# Patient Record
Sex: FEMALE | Race: WHITE | Employment: OTHER | URBAN - METROPOLITAN AREA
[De-identification: names, ages, dates, MRNs, and addresses within clinical notes are randomized per-mention and may not be internally consistent; named-entity substitution may affect disease eponyms.]

---

## 2017-01-11 ENCOUNTER — OFFICE VISIT (OUTPATIENT)
Dept: PAIN MANAGEMENT | Age: 57
End: 2017-01-11

## 2017-01-11 VITALS
BODY MASS INDEX: 27.76 KG/M2 | SYSTOLIC BLOOD PRESSURE: 133 MMHG | WEIGHT: 172 LBS | HEART RATE: 82 BPM | DIASTOLIC BLOOD PRESSURE: 79 MMHG

## 2017-01-11 DIAGNOSIS — Z98.890 HISTORY OF LUMBOSACRAL SPINE SURGERY: ICD-10-CM

## 2017-01-11 DIAGNOSIS — G89.29 CHRONIC BILATERAL LOW BACK PAIN WITHOUT SCIATICA: Primary | ICD-10-CM

## 2017-01-11 DIAGNOSIS — M54.2 CHRONIC NECK PAIN: ICD-10-CM

## 2017-01-11 DIAGNOSIS — M96.1 POSTLAMINECTOMY SYNDROME, LUMBAR REGION: ICD-10-CM

## 2017-01-11 DIAGNOSIS — M54.50 CHRONIC BILATERAL LOW BACK PAIN WITHOUT SCIATICA: Primary | ICD-10-CM

## 2017-01-11 DIAGNOSIS — M47.892 OTHER OSTEOARTHRITIS OF SPINE, CERVICAL REGION: ICD-10-CM

## 2017-01-11 DIAGNOSIS — M46.1 BILATERAL SACROILIITIS (HCC): ICD-10-CM

## 2017-01-11 DIAGNOSIS — G89.29 CHRONIC NECK PAIN: ICD-10-CM

## 2017-01-11 DIAGNOSIS — M53.3 SACROILIAC JOINT PAIN: ICD-10-CM

## 2017-01-11 DIAGNOSIS — M25.552 HIP PAIN, BILATERAL: ICD-10-CM

## 2017-01-11 DIAGNOSIS — M51.37 DEGENERATION OF LUMBAR OR LUMBOSACRAL INTERVERTEBRAL DISC: ICD-10-CM

## 2017-01-11 DIAGNOSIS — M25.551 HIP PAIN, BILATERAL: ICD-10-CM

## 2017-01-11 DIAGNOSIS — M48.061 LUMBAR STENOSIS: ICD-10-CM

## 2017-01-11 DIAGNOSIS — Z87.448 HISTORY OF PYELONEPHRITIS: ICD-10-CM

## 2017-01-11 RX ORDER — HYDROCODONE BITARTRATE AND ACETAMINOPHEN 10; 325 MG/1; MG/1
1 TABLET ORAL
Qty: 120 TAB | Refills: 0 | Status: SHIPPED | OUTPATIENT
Start: 2017-01-17 | End: 2017-01-11 | Stop reason: SDUPTHER

## 2017-01-11 RX ORDER — MORPHINE SULFATE 30 MG/1
30 TABLET, FILM COATED, EXTENDED RELEASE ORAL EVERY 24 HOURS
Qty: 30 TAB | Refills: 0 | Status: SHIPPED | OUTPATIENT
Start: 2017-01-17 | End: 2017-03-08 | Stop reason: SDUPTHER

## 2017-01-11 RX ORDER — HYDROCODONE BITARTRATE AND ACETAMINOPHEN 10; 325 MG/1; MG/1
1 TABLET ORAL
Qty: 120 TAB | Refills: 0 | Status: SHIPPED | OUTPATIENT
Start: 2017-02-16 | End: 2017-03-08 | Stop reason: SDUPTHER

## 2017-01-11 RX ORDER — HYDROCODONE BITARTRATE AND ACETAMINOPHEN 10; 325 MG/1; MG/1
1 TABLET ORAL
Qty: 50 TAB | Refills: 0 | Status: SHIPPED | OUTPATIENT
Start: 2017-02-16 | End: 2017-01-11

## 2017-01-11 RX ORDER — MORPHINE SULFATE 30 MG/1
30 TABLET, FILM COATED, EXTENDED RELEASE ORAL EVERY 24 HOURS
Qty: 30 TAB | Refills: 0 | Status: SHIPPED | OUTPATIENT
Start: 2017-02-16 | End: 2017-03-08 | Stop reason: SDUPTHER

## 2017-01-11 NOTE — MR AVS SNAPSHOT
Visit Information Date & Time Provider Department Dept. Phone Encounter #  
 1/11/2017  9:20 AM Myke Daly, [address] [place] [address] for Pain Management [phone] [id number] Upcoming Health Maintenance Date Due Hepatitis C Screening 1960 Pneumococcal 19-64 Medium Risk (1 of 1 - PPSV23) 4/23/1979 DTaP/Tdap/Td series (1 - Tdap) 4/23/1981 PAP AKA CERVICAL CYTOLOGY 4/23/1981 BREAST CANCER SCRN MAMMOGRAM 4/23/2010 FOBT Q 1 YEAR AGE 50-75 4/23/2010 INFLUENZA AGE 9 TO ADULT 8/1/2016 Allergies as of 1/11/2017  Review Complete On: 1/11/2017 By: Josue Carter LPN Severity Noted Reaction Type Reactions Iodinated Contrast Media - Oral And Iv Dye High 12/19/2012   Side Effect Anaphylaxis Compazine [Prochlorperazine Edisylate]  12/19/2012   Side Effect Angioedema Dystonic tongue Levaquin [Levofloxacin]  12/19/2012    Hives Prednisone  12/19/2012    Anxiety Current Immunizations  Never Reviewed Name Date Influenza Vaccine 10/12/2014 Not reviewed this visit You Were Diagnosed With   
  
 Codes Comments Chronic bilateral low back pain without sciatica    -  Primary ICD-10-CM: M54.5, G89.29 ICD-9-CM: 724.2, 338.29 Bilateral sacroiliitis (HCC)     ICD-10-CM: M46.1 ICD-9-CM: 720.2 Chronic neck pain     ICD-10-CM: M54.2, G89.29 ICD-9-CM: 723.1, 338.29 Degeneration of lumbar or lumbosacral intervertebral disc     ICD-10-CM: M51.37 
ICD-9-CM: 722.52 History of lumbosacral spine surgery     ICD-10-CM: Z98.890 ICD-9-CM: V15.29 Lumbar stenosis     ICD-10-CM: M48.06 
ICD-9-CM: 724.02 History of pyelonephritis     ICD-10-CM: Z87.440 ICD-9-CM: V13.02 Sacroiliac joint pain     ICD-10-CM: M53.3 ICD-9-CM: 724.6 Postlaminectomy syndrome, lumbar region     ICD-10-CM: M96.1 ICD-9-CM: 722.83 Hip pain, bilateral     ICD-10-CM: M25.551, M25.552 ICD-9-CM: 719.45   
 Other osteoarthritis of spine, cervical region     ICD-10-CM: Q75.966 Vitals BP Pulse Weight(growth percentile) BMI OB Status Smoking Status 133/79 (BP 1 Location: Right arm, BP Patient Position: Sitting) 82 172 lb (78 kg) 27.76 kg/m2 Hysterectomy Current Every Day Smoker Vitals History BMI and BSA Data Body Mass Index Body Surface Area  
 27.76 kg/m 2 1.91 m 2 [facility] [address] [phone] Your Updated Medication List  
  
   
This list is accurate as of: 1/11/17 10:09 AM.  Always use your most recent med list.  
  
  
  
  
 AMBIEN 10 mg tablet Generic drug:  zolpidem Take  by mouth nightly as needed for Sleep.  
  
 gabapentin 300 mg capsule Commonly known as:  NEURONTIN Take 300 mg by mouth three (3) times daily. HYDROcodone-acetaminophen  mg tablet Commonly known as:  Wick Denny Take 1 Tab by mouth four (4) times daily as needed for Pain for up to 30 days. Max Daily Amount: 4 Tabs. Indications: PAIN Start taking on:  2/16/2017 * morphine CR 30 mg CR tablet Commonly known as:  MS CONTIN Take 1 Tab by mouth every twenty-four (24) hours for 30 days. Max Daily Amount: 30 mg. for chronic, severe, refractory pain  Indications: CHRONIC PAIN, NEUROPATHIC PAIN, SEVERE PAIN Start taking on:  1/17/2017 * morphine CR 30 mg CR tablet Commonly known as:  MS CONTIN Take 1 Tab by mouth every twenty-four (24) hours for 30 days. Max Daily Amount: 30 mg. for chronic, severe, refractory pain  Indications: CHRONIC PAIN, NEUROPATHIC PAIN, SEVERE PAIN Start taking on:  2/16/2017  
  
 traZODone 100 mg tablet Commonly known as:  Lolo Dessert Take 2 Tabs by mouth nightly. For chronic insomnia * Notice: This list has 2 medication(s) that are the same as other medications prescribed for you.  Read the directions carefully, and ask your doctor or other care provider to review them with you. Prescriptions Printed Refills  
 morphine CR (MS CONTIN) 30 mg CR tablet 0 Starting on: 1/17/2017 Sig: Take 1 Tab by mouth every twenty-four (24) hours for 30 days. Max Daily Amount: 30 mg. for chronic, severe, refractory pain  Indications: CHRONIC PAIN, NEUROPATHIC PAIN, SEVERE PAIN Class: Print Route: Oral  
 morphine CR (MS CONTIN) 30 mg CR tablet 0 Starting on: 2/16/2017 Sig: Take 1 Tab by mouth every twenty-four (24) hours for 30 days. Max Daily Amount: 30 mg. for chronic, severe, refractory pain  Indications: CHRONIC PAIN, NEUROPATHIC PAIN, SEVERE PAIN Class: Print Route: Oral  
 HYDROcodone-acetaminophen (NORCO)  mg tablet 0 Starting on: 2/16/2017 Sig: Take 1 Tab by mouth four (4) times daily as needed for Pain for up to 30 days. Max Daily Amount: 4 Tabs. Indications: PAIN Class: Print Route: Oral  
  
Introducing [facility]! Dear Telly Escalera: Thank you for requesting a Moveline account. Our records indicate that you already have an active Moveline account. You can access your account anytime at [email or web address] Did you know that you can access your hospital and ER discharge instructions at any time in Moveline? You can also review all of your test results from your hospital stay or ER visit. Additional Information If you have questions, please visit the Frequently Asked Questions section of the Moveline website at [email or web address] Remember, Moveline is NOT to be used for urgent needs. For medical emergencies, dial 911. Now available from your iPhone and Android! Please provide this summary of care documentation to your next provider. Your primary care clinician is listed as Brendan Lomas. If you have any questions after today's visit, please call [phone].

## 2017-01-11 NOTE — PROGRESS NOTES
HISTORY OF PRESENT ILLNESS  Esha Garcia is a 64 y.o. female    HPI: Ms. Dustin Victoria  returns today for f/u of chronic low back pain, Neck pain, and polyarticular pain. H/o 2 lumbar surgery and 1 cervical fusion. She reports good improvement from her surgeries. She completed PT with some benefit. She did try prior epidurals prior to her surgery with no improvement, none since. Recent surgery for lumbar hernia 3/3/16. She continues unchanged since last visit. She is doing well with her current treatment plan. We recently adjusted her morphine ER down to once daily and she has been doing well with this adjustment. She is continuing to look for vacation/group home home and is settled for [place]. She says she has plans to split the vacation home with another couple. She is otherwise doing well with her current treatment plan with no new complaints today. I will have her follow-up in 2 months. Medication management consists of Morphine ER 30 mg QD and hydrocodone 10/325 QID PRN. Valium by her PCP (temporaray)  Medications are helping with pain control and quality of life. Her pain is 3-4/10 with medication and 6-7/10 without. Pt describes pain as aching and burning. Aggravating factors include standing and  walking. Relieved with rest, medication, lying down, and avoiding painful activities. Current treatment is helping to improve general activity, mood, walking, sleep, enjoyment of life. Pt does report constipation but under better control with MiraLAX and conservative treatment. She  is otherwise doing well with no other complaints today. She denies any adverse events including nausea, vomiting, dizziness, constipation, hallucinations, or seizures. Medications were brought to visit today.  Pill counts were appropriate    Attended education class 4/5/2016       Allergies   Allergen Reactions    Iodinated Contrast Media - Oral And Iv Dye Anaphylaxis    Compazine [Prochlorperazine Edisylate] Angioedema     Dystonic tongue    Levaquin [Levofloxacin] Hives    Prednisone Anxiety       Past Surgical History   Procedure Laterality Date    Hx lumbar diskectomy      Hx cholecystectomy      Hx appendectomy      Hx lysis of adhesions      Hx cervical diskectomy  2010    Hx other surgical  2006     Bladder suspension-     Hx  section  1987, 93, 95    Hx hysterectomy  1998     total    Hx orthopaedic       back surgery with hardware         Review of Systems   Constitutional: Negative. Negative for chills and fever. HENT: Negative for congestion and sore throat. Eyes: Negative for blurred vision and double vision. Respiratory: Negative for cough, shortness of breath and wheezing. Cardiovascular: Negative for chest pain and palpitations. Gastrointestinal: Negative for constipation, heartburn, nausea and vomiting. Genitourinary: Negative. Musculoskeletal: Positive for back pain, joint pain, myalgias and neck pain. Skin: Negative for itching and rash. Neurological: Negative for dizziness, seizures, loss of consciousness and headaches. Psychiatric/Behavioral: Negative. Negative for depression, hallucinations and suicidal ideas. The patient is not nervous/anxious. Physical Exam   Constitutional: She is oriented to person, place, and time and well-developed, well-nourished, and in no distress. No distress. HENT:   Head: Normocephalic and atraumatic. Eyes: EOM are normal.   Neck: Normal range of motion. Pulmonary/Chest: Effort normal.   Musculoskeletal: Normal range of motion. Neurological: She is alert and oriented to person, place, and time. She has normal reflexes. Skin: Skin is warm and dry. No rash noted. She is not diaphoretic. No erythema. Psychiatric: Mood, memory, affect and judgment normal.   Nursing note and vitals reviewed. ASSESSMENT:    1. Chronic bilateral low back pain without sciatica    2.  Bilateral sacroiliitis ([facility] [address]    3. Chronic neck pain    4. Degeneration of lumbar or lumbosacral intervertebral disc    5. History of lumbosacral spine surgery    6. Lumbar stenosis    7. History of pyelonephritis    8. Sacroiliac joint pain    9. Postlaminectomy syndrome, lumbar region    10. Hip pain, bilateral    11. Other osteoarthritis of spine, cervical region         [facility] Program was reviewed which does not demonstrate aberrancies and/or inconsistencies with regard to the historical prescribing of controlled medications to this patient by other providers. NOTE: Disclosed prescriptions for postsurgical pain 3/3, 3/10, 3/18, and 4/2. Cough syrup with hydrocodone 6/14, and 6/23. She reports that she did receive temporary rx for Valium by her PCP filled 11/11. PLAN / Pt Instructions:  1. Continue current plan with no evidence of addiction or diversion. Stable on current medication without adverse events. 2. Refill hydrocodone 10/325 mg up to 4 times daily as needed for pain. 3. Refill  morphine ER 30 mg  once daily. 4. Discussed risks of addiction, dependency, and opioid induced hyperalgesia. 5. Return to clinic in 2 months    Medications Ordered Today   Medications    morphine CR (MS CONTIN) 30 mg CR tablet     Sig: Take 1 Tab by mouth every twenty-four (24) hours for 30 days. Max Daily Amount: 30 mg. for chronic, severe, refractory pain  Indications: CHRONIC PAIN, NEUROPATHIC PAIN, SEVERE PAIN     Dispense:  30 Tab     Refill:  0    morphine CR (MS CONTIN) 30 mg CR tablet     Sig: Take 1 Tab by mouth every twenty-four (24) hours for 30 days. Max Daily Amount: 30 mg. for chronic, severe, refractory pain  Indications: CHRONIC PAIN, NEUROPATHIC PAIN, SEVERE PAIN     Dispense:  30 Tab     Refill:  0    DISCONTD: HYDROcodone-acetaminophen (NORCO)  mg tablet     Sig: Take 1 Tab by mouth four (4) times daily as needed for Pain for up to 30 days. Max Daily Amount: 4 Tabs. Indications: PAIN     Dispense:  120 Tab     Refill:  0    DISCONTD: HYDROcodone-acetaminophen (NORCO)  mg tablet     Sig: Take 1 Tab by mouth four (4) times daily as needed for Pain. Max Daily Amount: 4 Tabs. Dispense:  50 Tab     Refill:  0    HYDROcodone-acetaminophen (NORCO)  mg tablet     Sig: Take 1 Tab by mouth four (4) times daily as needed for Pain for up to 30 days. Max Daily Amount: 4 Tabs. Indications: PAIN     Dispense:  120 Tab     Refill:  0       Pain medications prescribed with the objective of pain relief and improved physical and psychosocial function in this patient. Spent 25 minutes with patient today reviewing the treatment plan, goals of treatment plan, and limitations of the treatment plan, to include the potential for side effects from medications and procedures. Zack Cisnerosma 1/11/2017      Note: Please excuse any typographical errors. Voice recognition software was used for this note and may cause mistakes.

## 2017-01-11 NOTE — PROGRESS NOTES
Nursing Notes    Patient presents to the office today in follow-up. Patient rates her pain at 6/10 on the numerical pain scale. Reviewed medications with counts as follows:    Rx Date filled Qty Dispensed Pill Count Last Dose Short   norco 10/325mg  12/18/16 120 33 01/10/2017 No    Morphine sulfate 30mg ER 12/18/16 30 8 01/10/2017 No                                     Comments:     POC UDS was not performed in office today    Any new labs or imaging since last appointment? NO    Have you been to an emergency room (ER) or urgent care clinic since your last visit? NO            Have you been hospitalized since your last visit? NO     If yes, where, when, and reason for visit? Have you seen or consulted any other health care providers outside of the [address] [place] [address]  since your last visit? YES     If yes, where, when, and reason for visit? pcp     HM deferred to pcp.

## 2017-01-11 NOTE — PATIENT INSTRUCTIONS
1. Continue current plan with no evidence of addiction or diversion. Stable on current medication without adverse events. 2. Refill hydrocodone 10/325 mg up to 4 times daily as needed for pain. 3. Refill  morphine ER 30 mg  once daily. 4. Discussed risks of addiction, dependency, and opioid induced hyperalgesia.    5. Return to clinic in 2 months

## 2017-03-08 ENCOUNTER — OFFICE VISIT (OUTPATIENT)
Dept: PAIN MANAGEMENT | Age: 57
End: 2017-03-08

## 2017-03-08 VITALS
DIASTOLIC BLOOD PRESSURE: 72 MMHG | WEIGHT: 172 LBS | BODY MASS INDEX: 27.76 KG/M2 | HEART RATE: 88 BPM | SYSTOLIC BLOOD PRESSURE: 105 MMHG

## 2017-03-08 DIAGNOSIS — M54.50 CHRONIC BILATERAL LOW BACK PAIN WITHOUT SCIATICA: ICD-10-CM

## 2017-03-08 DIAGNOSIS — M96.1 POSTLAMINECTOMY SYNDROME, LUMBAR REGION: ICD-10-CM

## 2017-03-08 DIAGNOSIS — M47.892 OTHER OSTEOARTHRITIS OF SPINE, CERVICAL REGION: ICD-10-CM

## 2017-03-08 DIAGNOSIS — M32.9 SLE (SYSTEMIC LUPUS ERYTHEMATOSUS) (HCC): ICD-10-CM

## 2017-03-08 DIAGNOSIS — G89.29 CHRONIC NECK PAIN: ICD-10-CM

## 2017-03-08 DIAGNOSIS — M46.1 BILATERAL SACROILIITIS (HCC): ICD-10-CM

## 2017-03-08 DIAGNOSIS — G89.29 CHRONIC BILATERAL LOW BACK PAIN WITHOUT SCIATICA: ICD-10-CM

## 2017-03-08 DIAGNOSIS — Z79.899 ENCOUNTER FOR LONG-TERM (CURRENT) USE OF MEDICATIONS: Primary | ICD-10-CM

## 2017-03-08 DIAGNOSIS — M54.2 CHRONIC NECK PAIN: ICD-10-CM

## 2017-03-08 DIAGNOSIS — M79.18 PIRIFORMIS MUSCLE PAIN: ICD-10-CM

## 2017-03-08 DIAGNOSIS — M53.3 SACROILIAC JOINT PAIN: ICD-10-CM

## 2017-03-08 DIAGNOSIS — M48.061 LUMBAR STENOSIS: ICD-10-CM

## 2017-03-08 DIAGNOSIS — M51.37 DEGENERATION OF LUMBAR OR LUMBOSACRAL INTERVERTEBRAL DISC: ICD-10-CM

## 2017-03-08 LAB
ALCOHOL UR POC: NORMAL
AMPHETAMINES UR POC: NEGATIVE
BARBITURATES UR POC: NEGATIVE
BENZODIAZEPINES UR POC: NORMAL
BUPRENORPHINE UR POC: NORMAL
CANNABINOIDS UR POC: NEGATIVE
CARISOPRODOL UR POC: NORMAL
COCAINE UR POC: NEGATIVE
FENTANYL UR POC: NORMAL
MDMA/ECSTASY UR POC: NEGATIVE
METHADONE UR POC: NEGATIVE
METHAMPHETAMINE UR POC: NEGATIVE
METHYLPHENIDATE UR POC: NEGATIVE
OPIATES UR POC: NORMAL
OXYCODONE UR POC: NORMAL
PHENCYCLIDINE UR POC: NORMAL
PROPOXYPHENE UR POC: NORMAL
TRAMADOL UR POC: NORMAL
TRICYCLICS UR POC: NEGATIVE

## 2017-03-08 RX ORDER — MORPHINE SULFATE 30 MG/1
30 TABLET, FILM COATED, EXTENDED RELEASE ORAL EVERY 24 HOURS
Qty: 30 TAB | Refills: 0 | Status: SHIPPED | OUTPATIENT
Start: 2017-04-17 | End: 2017-05-03 | Stop reason: SDUPTHER

## 2017-03-08 RX ORDER — HYDROCODONE BITARTRATE AND ACETAMINOPHEN 10; 325 MG/1; MG/1
1 TABLET ORAL
Qty: 120 TAB | Refills: 0 | Status: SHIPPED | OUTPATIENT
Start: 2017-03-18 | End: 2017-05-03 | Stop reason: SDUPTHER

## 2017-03-08 RX ORDER — HYDROCODONE BITARTRATE AND ACETAMINOPHEN 10; 325 MG/1; MG/1
1 TABLET ORAL
Qty: 120 TAB | Refills: 0 | Status: SHIPPED | OUTPATIENT
Start: 2017-04-17 | End: 2017-05-03 | Stop reason: SDUPTHER

## 2017-03-08 RX ORDER — MORPHINE SULFATE 30 MG/1
30 TABLET, FILM COATED, EXTENDED RELEASE ORAL EVERY 24 HOURS
Qty: 30 TAB | Refills: 0 | Status: SHIPPED | OUTPATIENT
Start: 2017-03-18 | End: 2017-05-03 | Stop reason: SDUPTHER

## 2017-03-08 NOTE — MR AVS SNAPSHOT
Visit Information Date & Time Provider Department Dept. Phone Encounter #  
 3/8/2017  1:00 PM Lexii Tirado, [facility] for Pain Management [phone] Follow-up Instructions Return in about 2 months (around 5/8/2017). Upcoming Health Maintenance Date Due Hepatitis C Screening 1960 Pneumococcal 19-64 Medium Risk (1 of 1 - PPSV23) 4/23/1979 DTaP/Tdap/Td series (1 - Tdap) 4/23/1981 PAP AKA CERVICAL CYTOLOGY 4/23/1981 BREAST CANCER SCRN MAMMOGRAM 4/23/2010 FOBT Q 1 YEAR AGE 50-75 4/23/2010 INFLUENZA AGE 9 TO ADULT 8/1/2016 Allergies as of 3/8/2017  Review Complete On: 3/8/2017 By: PRIYANKA Petersen Severity Noted Reaction Type Reactions Iodinated Contrast Media - Oral And Iv Dye High 12/19/2012   Side Effect Anaphylaxis Compazine [Prochlorperazine Edisylate]  12/19/2012   Side Effect Angioedema Dystonic tongue Levaquin [Levofloxacin]  12/19/2012    Hives Prednisone  12/19/2012    Anxiety Current Immunizations  Never Reviewed Name Date Influenza Vaccine 10/12/2014 Not reviewed this visit You Were Diagnosed With   
  
 Codes Comments Encounter for long-term (current) use of medications    -  Primary ICD-10-CM: X74.457 ICD-9-CM: V58.69   
 SLE (systemic lupus erythematosus) (HCC)     ICD-10-CM: M32.9 ICD-9-CM: 710.0 Bilateral sacroiliitis (HCC)     ICD-10-CM: M46.1 ICD-9-CM: 720.2 Lumbar stenosis     ICD-10-CM: M48.06 
ICD-9-CM: 724.02 Chronic bilateral low back pain without sciatica     ICD-10-CM: M54.5, G89.29 ICD-9-CM: 724.2, 338.29 Degeneration of lumbar or lumbosacral intervertebral disc     ICD-10-CM: M51.37 
ICD-9-CM: 722.52 Chronic neck pain     ICD-10-CM: M54.2, G89.29 ICD-9-CM: 723.1, 338.29 Other osteoarthritis of spine, cervical region     ICD-10-CM: G63.206 Sacroiliac joint pain     ICD-10-CM: M53.3 ICD-9-CM: 724.6 Postlaminectomy syndrome, lumbar region     ICD-10-CM: M96.1 ICD-9-CM: 722.83 Piriformis muscle pain     ICD-10-CM: M79.1 ICD-9-CM: 729.1 Vitals BP Pulse Weight(growth percentile) BMI OB Status Smoking Status 105/72 (BP 1 Location: Left arm, BP Patient Position: Sitting) 88 172 lb (78 kg) 27.76 kg/m2 Hysterectomy Current Every Day Smoker BMI and BSA Data Body Mass Index Body Surface Area  
 27.76 kg/m 2 1.91 m 2 Preferred Pharmacy Pharmacy Name Phone [facility] [address] [phone] Your Updated Medication List  
  
   
This list is accurate as of: 3/8/17  2:01 PM.  Always use your most recent med list.  
  
  
  
  
 AMBIEN 10 mg tablet Generic drug:  zolpidem Take  by mouth nightly as needed for Sleep.  
  
 gabapentin 300 mg capsule Commonly known as:  NEURONTIN Take 300 mg by mouth three (3) times daily. * HYDROcodone-acetaminophen  mg tablet Commonly known as:  Nelma Ct Take 1 Tab by mouth four (4) times daily as needed for Pain for up to 30 days. Max Daily Amount: 4 Tabs. Indications: Pain Start taking on:  3/18/2017  
  
 * HYDROcodone-acetaminophen  mg tablet Commonly known as:  Nelma Ct Take 1 Tab by mouth four (4) times daily as needed for Pain for up to 30 days. Max Daily Amount: 4 Tabs. Indications: Pain Start taking on:  4/17/2017 * morphine CR 30 mg CR tablet Commonly known as:  MS CONTIN Take 1 Tab by mouth every twenty-four (24) hours for 30 days. Max Daily Amount: 30 mg. for chronic, severe, refractory pain  Indications: Chronic Pain, NEUROPATHIC PAIN, Severe Pain Start taking on:  3/18/2017 * morphine CR 30 mg CR tablet Commonly known as:  MS CONTIN Take 1 Tab by mouth every twenty-four (24) hours for 30 days. Max Daily Amount: 30 mg. for chronic, severe, refractory pain  Indications: Chronic Pain, NEUROPATHIC PAIN, Severe Pain Start taking on:  4/17/2017  
  
 traZODone 100 mg tablet Commonly known as:  Cornel Dinh Take 2 Tabs by mouth nightly. For chronic insomnia * Notice: This list has 4 medication(s) that are the same as other medications prescribed for you. Read the directions carefully, and ask your doctor or other care provider to review them with you. Prescriptions Printed Refills  
 morphine CR (MS CONTIN) 30 mg CR tablet 0 Starting on: 3/18/2017 Sig: Take 1 Tab by mouth every twenty-four (24) hours for 30 days. Max Daily Amount: 30 mg. for chronic, severe, refractory pain  Indications: Chronic Pain, NEUROPATHIC PAIN, Severe Pain Class: Print Route: Oral  
 morphine CR (MS CONTIN) 30 mg CR tablet 0 Starting on: 4/17/2017 Sig: Take 1 Tab by mouth every twenty-four (24) hours for 30 days. Max Daily Amount: 30 mg. for chronic, severe, refractory pain  Indications: Chronic Pain, NEUROPATHIC PAIN, Severe Pain Class: Print Route: Oral  
 HYDROcodone-acetaminophen (NORCO)  mg tablet 0 Starting on: 3/18/2017 Sig: Take 1 Tab by mouth four (4) times daily as needed for Pain for up to 30 days. Max Daily Amount: 4 Tabs. Indications: Pain Class: Print Route: Oral  
 HYDROcodone-acetaminophen (NORCO)  mg tablet 0 Starting on: 4/17/2017 Sig: Take 1 Tab by mouth four (4) times daily as needed for Pain for up to 30 days. Max Daily Amount: 4 Tabs. Indications: Pain Class: Print Route: Oral  
  
We Performed the Following AMB POC DRUG SCREEN () [ [facility]] DRUG SCREEN [[id number] Custom] Follow-up Instructions Return in about 2 months (around 5/8/2017). Patient Instructions 1. Continue current plan with no evidence of addiction or diversion. Stable on current medication without adverse events. 2. Refill hydrocodone 10/325 mg up to 4 times daily as needed for pain. 3. Refill  morphine ER 30 mg  once daily. 4. Add Home exercise program. Exercise/stretching were demonstrated in office today. 5. Discussed risks of addiction, dependency, and opioid induced hyperalgesia. 6. Return to clinic in 2 months Introducing [facility] & HEALTH SERVICES! Dear Bonilla Anderson: Thank you for requesting a Smart Balloon account. Our records indicate that you already have an active Smart Balloon account. You can access your account anytime at [email or web address] Did you know that you can access your hospital and ER discharge instructions at any time in Smart Balloon? You can also review all of your test results from your hospital stay or ER visit. Additional Information If you have questions, please visit the Frequently Asked Questions section of the Smart Balloon website at [email or web address] Remember, Smart Balloon is NOT to be used for urgent needs. For medical emergencies, dial 911. Now available from your iPhone and Android! Please provide this summary of care documentation to your next provider. Your primary care clinician is listed as Brendan Lomas. If you have any questions after today's visit, please call [phone].

## 2017-03-08 NOTE — PROGRESS NOTES
HISTORY OF PRESENT ILLNESS  Xochilt Parson is a 64 y.o. female    HPI: Ms. Tianna Stark  returns today for f/u of chronic low back pain, Neck pain, and polyarticular pain. H/o 2 lumbar surgery and 1 cervical fusion. She reports good improvement from her surgeries. She completed PT with some benefit. She did try prior epidurals prior to her surgery with no improvement, none since. Recent surgery for lumbar hernia 3/3/16. She reports some mild worsening low back pain since her last visit. She says this has been \"the worst 2 months since before her surgery. \"  She reports no acute injury or fall. She does think her worsening pain could be due to the weather. She reports her pain begins in her left buttock area just below her PSIS and radiates down her buttock aspect. She has been using heat. Her last CT scan was reviewed and discussed in office today which showed some inflammation of her piriformis muscle. During the exam she points to her piriformis area as the culprit of her pain. We discussed adding home exercise program.  Exercise/stretching were demonstrated in office today. I have advised her to continue with warm moist heat as well. I will have her follow-up in 2 months for reassessment. Medication management consists of Morphine ER 30 mg QD and hydrocodone 10/325 QID PRN. Valium, zolpidem, and Trazadon by her PCP. Medications are helping with pain control and quality of life. Her pain is 3-4/10 with medication and 6-7/10 without. Pt describes pain as aching and burning. Aggravating factors include standing and  walking. Relieved with rest, medication, lying down, and avoiding painful activities. Current treatment is helping to improve general activity, mood, walking, sleep, enjoyment of life. Pt does report constipation but under better control with MiraLAX and conservative treatment. She  is otherwise doing well with no other complaints today.  She denies any adverse events including nausea, vomiting, dizziness, constipation, hallucinations, or seizures. Attended education class 2016       Allergies   Allergen Reactions    Iodinated Contrast Media - Oral And Iv Dye Anaphylaxis    Compazine [Prochlorperazine Edisylate] Angioedema     Dystonic tongue    Levaquin [Levofloxacin] Hives    Prednisone Anxiety       Past Surgical History:   Procedure Laterality Date    HX APPENDECTOMY  2009    HX CERVICAL DISKECTOMY  2010    HX  SECTION  1987, 93, 95    HX CHOLECYSTECTOMY  2010    HX HYSTERECTOMY  1998    total    HX LUMBAR DISKECTOMY      HX LYSIS OF ADHESIONS      HX ORTHOPAEDIC      back surgery with hardware    HX OTHER SURGICAL  2006    Bladder suspension-          Review of Systems   Constitutional: Negative. Negative for chills and fever. HENT: Negative for congestion and sore throat. Eyes: Negative for blurred vision and double vision. Respiratory: Negative for cough, shortness of breath and wheezing. Cardiovascular: Negative for chest pain and palpitations. Gastrointestinal: Negative for constipation, heartburn, nausea and vomiting. Genitourinary: Negative. Musculoskeletal: Positive for back pain, joint pain, myalgias and neck pain. Skin: Negative for itching and rash. Neurological: Negative for dizziness, seizures, loss of consciousness and headaches. Psychiatric/Behavioral: Negative. Negative for depression, hallucinations and suicidal ideas. The patient is not nervous/anxious. Physical Exam   Constitutional: She is oriented to person, place, and time and well-developed, well-nourished, and in no distress. No distress. HENT:   Head: Normocephalic and atraumatic. Eyes: EOM are normal.   Neck: Normal range of motion. Pulmonary/Chest: Effort normal.   Musculoskeletal: Normal range of motion. Tender palpation in left piriformis aspect with mild spasm noted.    Neurological: She is alert and oriented to person, place, and time. She has normal reflexes. Skin: Skin is warm and dry. No rash noted. She is not diaphoretic. No erythema. Psychiatric: Mood, memory, affect and judgment normal.   Nursing note and vitals reviewed. ASSESSMENT:    1. Encounter for long-term (current) use of medications    2. SLE (systemic lupus erythematosus) ([facility] [address]    3. Bilateral sacroiliitis ([facility] [address]    4. Lumbar stenosis    5. Chronic bilateral low back pain without sciatica    6. Degeneration of lumbar or lumbosacral intervertebral disc    7. Chronic neck pain    8. Other osteoarthritis of spine, cervical region    9. Sacroiliac joint pain    10. Postlaminectomy syndrome, lumbar region    11. Piriformis muscle pain         [place] Prescription Monitoring Program was reviewed which does not demonstrate aberrancies and/or inconsistencies with regard to the historical prescribing of controlled medications to this patient by other providers. NOTE: Disclosed prescriptions for postsurgical pain 3/3, 3/10, 3/18, and 4/2. Cough syrup with hydrocodone 6/14, and 6/23. She reports that she did receive temporary rx for Valium by her PCP filled 11/11. PLAN / Pt Instructions:  1. Continue current plan with no evidence of addiction or diversion. Stable on current medication without adverse events. 2. Refill hydrocodone 10/325 mg up to 4 times daily as needed for pain. 3. Refill  morphine ER 30 mg  once daily. 4. Add Home exercise program. Exercise/stretching were demonstrated in office today. 5. Discussed risks of addiction, dependency, and opioid induced hyperalgesia. 6. Return to clinic in 2 months    Medications Ordered Today   Medications    morphine CR (MS CONTIN) 30 mg CR tablet     Sig: Take 1 Tab by mouth every twenty-four (24) hours for 30 days.  Max Daily Amount: 30 mg. for chronic, severe, refractory pain  Indications: Chronic Pain, NEUROPATHIC PAIN, Severe Pain     Dispense:  30 Tab     Refill:  0    morphine CR (MS CONTIN) 30 mg CR tablet     Sig: Take 1 Tab by mouth every twenty-four (24) hours for 30 days. Max Daily Amount: 30 mg. for chronic, severe, refractory pain  Indications: Chronic Pain, NEUROPATHIC PAIN, Severe Pain     Dispense:  30 Tab     Refill:  0    HYDROcodone-acetaminophen (NORCO)  mg tablet     Sig: Take 1 Tab by mouth four (4) times daily as needed for Pain for up to 30 days. Max Daily Amount: 4 Tabs. Indications: Pain     Dispense:  120 Tab     Refill:  0    HYDROcodone-acetaminophen (NORCO)  mg tablet     Sig: Take 1 Tab by mouth four (4) times daily as needed for Pain for up to 30 days. Max Daily Amount: 4 Tabs. Indications: Pain     Dispense:  120 Tab     Refill:  0       Pain medications prescribed with the objective of pain relief and improved physical and psychosocial function in this patient. Spent 25 minutes with patient today reviewing the treatment plan, goals of treatment plan, and limitations of the treatment plan, to include the potential for side effects from medications and procedures. Lily Beltran 3/8/2017      Note: Please excuse any typographical errors. Voice recognition software was used for this note and may cause mistakes.

## 2017-03-08 NOTE — PROGRESS NOTES
Nursing Notes    Patient presents to the office today in follow-up. Patient rates her pain at 7/10 on the numerical pain scale. Reviewed medications with counts as follows:    Rx Date filled Qty Dispensed Pill Count Last Dose Short   norco 10/325mg  02/16/17 120 40 This am  No    Morphine sulfate 30mg ER 02/16/17 30 9 This am  No                                     Comments:     POC UDS was performed in office today    Any new labs or imaging since last appointment? NO    Have you been to an emergency room (ER) or urgent care clinic since your last visit? NO            Have you been hospitalized since your last visit? NO     If yes, where, when, and reason for visit? Have you seen or consulted any other health care providers outside of the [address] [place] [address]  since your last visit?   YES     If yes, where, when, and reason for visit? pcp

## 2017-03-08 NOTE — PATIENT INSTRUCTIONS
1. Continue current plan with no evidence of addiction or diversion. Stable on current medication without adverse events. 2. Refill hydrocodone 10/325 mg up to 4 times daily as needed for pain. 3. Refill  morphine ER 30 mg  once daily. 4. Add Home exercise program. Exercise/stretching were demonstrated in office today. 5. Discussed risks of addiction, dependency, and opioid induced hyperalgesia.    6. Return to clinic in 2 months

## 2017-05-03 ENCOUNTER — OFFICE VISIT (OUTPATIENT)
Dept: PAIN MANAGEMENT | Age: 57
End: 2017-05-03

## 2017-05-03 VITALS — DIASTOLIC BLOOD PRESSURE: 70 MMHG | SYSTOLIC BLOOD PRESSURE: 107 MMHG | HEART RATE: 87 BPM | RESPIRATION RATE: 17 BRPM

## 2017-05-03 DIAGNOSIS — G89.29 CHRONIC BILATERAL LOW BACK PAIN WITHOUT SCIATICA: ICD-10-CM

## 2017-05-03 DIAGNOSIS — G57.02 PIRIFORMIS SYNDROME OF LEFT SIDE: Primary | ICD-10-CM

## 2017-05-03 DIAGNOSIS — M76.899 ENTHESOPATHY OF HIP REGION, UNSPECIFIED LATERALITY: ICD-10-CM

## 2017-05-03 DIAGNOSIS — M54.2 CHRONIC NECK PAIN: ICD-10-CM

## 2017-05-03 DIAGNOSIS — M48.061 LUMBAR STENOSIS: ICD-10-CM

## 2017-05-03 DIAGNOSIS — M54.50 CHRONIC BILATERAL LOW BACK PAIN WITHOUT SCIATICA: ICD-10-CM

## 2017-05-03 DIAGNOSIS — M96.1 POSTLAMINECTOMY SYNDROME, LUMBAR REGION: ICD-10-CM

## 2017-05-03 DIAGNOSIS — M46.1 BILATERAL SACROILIITIS (HCC): ICD-10-CM

## 2017-05-03 DIAGNOSIS — G89.29 CHRONIC NECK PAIN: ICD-10-CM

## 2017-05-03 DIAGNOSIS — M51.37 DEGENERATION OF LUMBAR OR LUMBOSACRAL INTERVERTEBRAL DISC: ICD-10-CM

## 2017-05-03 DIAGNOSIS — M53.3 SACROILIAC JOINT PAIN: ICD-10-CM

## 2017-05-03 RX ORDER — NALOXONE HYDROCHLORIDE 4 MG/.1ML
4 SPRAY NASAL AS NEEDED
Qty: 1 PACKAGE | Refills: 0 | Status: SHIPPED | OUTPATIENT
Start: 2017-05-03 | End: 2017-05-03 | Stop reason: CLARIF

## 2017-05-03 RX ORDER — MORPHINE SULFATE 30 MG/1
30 TABLET, FILM COATED, EXTENDED RELEASE ORAL EVERY 24 HOURS
Qty: 30 TAB | Refills: 0 | Status: SHIPPED | OUTPATIENT
Start: 2017-05-17 | End: 2017-06-28 | Stop reason: SDUPTHER

## 2017-05-03 RX ORDER — HYDROCODONE BITARTRATE AND ACETAMINOPHEN 10; 325 MG/1; MG/1
1 TABLET ORAL
Qty: 120 TAB | Refills: 0 | Status: SHIPPED | OUTPATIENT
Start: 2017-06-16 | End: 2017-06-28 | Stop reason: SDUPTHER

## 2017-05-03 RX ORDER — MORPHINE SULFATE 30 MG/1
30 TABLET, FILM COATED, EXTENDED RELEASE ORAL EVERY 24 HOURS
Qty: 30 TAB | Refills: 0 | Status: SHIPPED | OUTPATIENT
Start: 2017-06-16 | End: 2017-06-28 | Stop reason: SDUPTHER

## 2017-05-03 RX ORDER — HYDROCODONE BITARTRATE AND ACETAMINOPHEN 10; 325 MG/1; MG/1
1 TABLET ORAL
Qty: 120 TAB | Refills: 0 | Status: SHIPPED | OUTPATIENT
Start: 2017-05-17 | End: 2017-06-28 | Stop reason: SDUPTHER

## 2017-05-03 RX ORDER — NALOXONE HYDROCHLORIDE 4 MG/.1ML
4 SPRAY NASAL AS NEEDED
Qty: 1 PACKAGE | Refills: 0 | Status: SHIPPED | OUTPATIENT
Start: 2017-05-03 | End: 2020-09-04

## 2017-05-03 NOTE — MR AVS SNAPSHOT
Visit Information Date & Time Provider Department Dept. Phone Encounter #  
 5/3/2017  9:00 AM Lynsey Russ [facility] [phone] [id number] Follow-up Instructions Return in about 2 months (around 7/3/2017). Upcoming Health Maintenance Date Due Hepatitis C Screening 1960 Pneumococcal 19-64 Medium Risk (1 of 1 - PPSV23) 4/23/1979 DTaP/Tdap/Td series (1 - Tdap) 4/23/1981 PAP AKA CERVICAL CYTOLOGY 4/23/1981 BREAST CANCER SCRN MAMMOGRAM 4/23/2010 FOBT Q 1 YEAR AGE 50-75 4/23/2010 INFLUENZA AGE 9 TO ADULT 8/1/2017 Allergies as of 5/3/2017  Review Complete On: 5/3/2017 By: PRIYANKA Londono Severity Noted Reaction Type Reactions Iodinated Contrast Media - Oral And Iv Dye High 12/19/2012   Side Effect Anaphylaxis Compazine [Prochlorperazine Edisylate]  12/19/2012   Side Effect Angioedema Dystonic tongue Levaquin [Levofloxacin]  12/19/2012    Hives Prednisone  12/19/2012    Anxiety Current Immunizations  Never Reviewed Name Date Influenza Vaccine 10/12/2014 Not reviewed this visit You Were Diagnosed With   
  
 Codes Comments Postlaminectomy syndrome, lumbar region     ICD-10-CM: M96.1 ICD-9-CM: 722.83 Chronic bilateral low back pain without sciatica     ICD-10-CM: M54.5, G89.29 ICD-9-CM: 724.2, 338.29 Degeneration of lumbar or lumbosacral intervertebral disc     ICD-10-CM: M51.37 
ICD-9-CM: 722.52 Chronic neck pain     ICD-10-CM: M54.2, G89.29 ICD-9-CM: 723.1, 338.29 Sacroiliac joint pain     ICD-10-CM: M53.3 ICD-9-CM: 724.6 Enthesopathy of hip region, unspecified laterality     ICD-10-CM: M76.899 ICD-9-CM: 726.5 Lumbar stenosis     ICD-10-CM: M48.06 
ICD-9-CM: 724.02 Bilateral sacroiliitis (HCC)     ICD-10-CM: M46.1 ICD-9-CM: 720.2 Vitals BP Pulse Resp OB Status Smoking Status 107/70 87 17 Hysterectomy Current Every Day Smoker Vitals History Preferred Pharmacy Pharmacy Name Phone [facility] [id number] [phone] Your Updated Medication List  
  
   
This list is accurate as of: 5/3/17  9:56 AM.  Always use your most recent med list.  
  
  
  
  
 AMBIEN 10 mg tablet Generic drug:  zolpidem Take  by mouth nightly as needed for Sleep.  
  
 gabapentin 300 mg capsule Commonly known as:  NEURONTIN Take 300 mg by mouth three (3) times daily. * HYDROcodone-acetaminophen  mg tablet Commonly known as:  Labelle Martin Take 1 Tab by mouth four (4) times daily as needed for Pain for up to 30 days. Max Daily Amount: 4 Tabs. Indications: Pain Start taking on:  5/17/2017  
  
 * HYDROcodone-acetaminophen  mg tablet Commonly known as:  Roman Martin Take 1 Tab by mouth four (4) times daily as needed for Pain for up to 30 days. Max Daily Amount: 4 Tabs. Indications: Pain Start taking on:  6/16/2017 * morphine CR 30 mg CR tablet Commonly known as:  MS CONTIN Take 1 Tab by mouth every twenty-four (24) hours for 30 days. Max Daily Amount: 30 mg. for chronic, severe, refractory pain  Indications: Chronic Pain, NEUROPATHIC PAIN, Severe Pain Start taking on:  5/17/2017 * morphine CR 30 mg CR tablet Commonly known as:  MS CONTIN Take 1 Tab by mouth every twenty-four (24) hours for 30 days. Max Daily Amount: 30 mg. for chronic, severe, refractory pain  Indications: Chronic Pain, NEUROPATHIC PAIN, Severe Pain Start taking on:  6/16/2017  
  
 naloxone 4 mg/actuation Spry 4 mg by Nasal route as needed. For emergency use only  Indications: OPIATE-INDUCED RESPIRATORY DEPRESSION  
  
 traZODone 100 mg tablet Commonly known as:  Arneta Messenger Take 2 Tabs by mouth nightly. For chronic insomnia * Notice:   This list has 4 medication(s) that are the same as other medications prescribed for you. Read the directions carefully, and ask your doctor or other care provider to review them with you. Prescriptions Printed Refills  
 morphine CR (MS CONTIN) 30 mg CR tablet 0 Starting on: 2017 Sig: Take 1 Tab by mouth every twenty-four (24) hours for 30 days. Max Daily Amount: 30 mg. for chronic, severe, refractory pain  Indications: Chronic Pain, NEUROPATHIC PAIN, Severe Pain Class: Print Route: Oral  
 morphine CR (MS CONTIN) 30 mg CR tablet 0 Starting on: 2017 Sig: Take 1 Tab by mouth every twenty-four (24) hours for 30 days. Max Daily Amount: 30 mg. for chronic, severe, refractory pain  Indications: Chronic Pain, NEUROPATHIC PAIN, Severe Pain Class: Print Route: Oral  
 HYDROcodone-acetaminophen (NORCO)  mg tablet 0 Starting on: 2017 Sig: Take 1 Tab by mouth four (4) times daily as needed for Pain for up to 30 days. Max Daily Amount: 4 Tabs. Indications: Pain Class: Print Route: Oral  
 HYDROcodone-acetaminophen (NORCO)  mg tablet 0 Starting on: 2017 Sig: Take 1 Tab by mouth four (4) times daily as needed for Pain for up to 30 days. Max Daily Amount: 4 Tabs. Indications: Pain Class: Print Route: Oral  
  
Prescriptions Sent to Pharmacy Refills  
 naloxone 4 mg/actuation spry 0 Si mg by Nasal route as needed. For emergency use only  Indications: OPIATE-INDUCED RESPIRATORY DEPRESSION Class: Normal  
 Pharmacy: [facility]/pharmacy #[id number]- [address]  #: [phone] Route: Nasal  
  
Follow-up Instructions Return in about 2 months (around 7/3/2017). Patient Instructions 1. Continue current plan with no evidence of addiction or diversion. Stable on current medication without adverse events. 2. Refill hydrocodone 10/325 mg up to 4 times daily as needed for pain. 3. Refill  morphine ER 30 mg  once daily. 4. Add naloxone 0.4mg/0.4 mL auto injector for opioid induced respiratory depression emergency only. Extensive counseling was provided regarding this medication. Instructions were given to take home. 5. Schedule left piriformis Injection with Dr. Megan Sibley. 6. Add Home exercise program. Exercise/stretching were demonstrated in office today. 7. Discussed risks of addiction, dependency, and opioid induced hyperalgesia. 8. Return to clinic in 2 months Introducing [facility]! Dear Unknown Skylar: Thank you for requesting a [employer] account. Our records indicate that you already have an active [employer] account. You can access your account anytime at [email or web address] Gift It Did you know that you can access your hospital and ER discharge instructions at any time in [employer]? You can also review all of your test results from your hospital stay or ER visit. Additional Information If you have questions, please visit the Frequently Asked Questions section of the [employer] website at [email or web address] Remember, [employer] is NOT to be used for urgent needs. For medical emergencies, dial 911. Now available from your iPhone and Android! Please provide this summary of care documentation to your next provider. Your primary care clinician is listed as Brendan Lomas. If you have any questions after today's visit, please call [phone].

## 2017-05-03 NOTE — PATIENT INSTRUCTIONS
1. Continue current plan with no evidence of addiction or diversion. Stable on current medication without adverse events. 2. Refill hydrocodone 10/325 mg up to 4 times daily as needed for pain. 3. Refill  morphine ER 30 mg  once daily. 4. Add naloxone 0.4mg/0.4 mL auto injector for opioid induced respiratory depression emergency only. Extensive counseling was provided regarding this medication. Instructions were given to take home. 5. Schedule left piriformis Injection with Dr. Suyapa Woods. 6. Add Home exercise program. Exercise/stretching were demonstrated in office today. 7. Discussed risks of addiction, dependency, and opioid induced hyperalgesia.    8. Return to clinic in 2 months

## 2017-05-03 NOTE — PROGRESS NOTES
HISTORY OF PRESENT ILLNESS  Jocelyn Jimenez is a 62 y.o. female    HPI: Ms. Josué Henning  returns today for f/u of chronic low back pain, Neck pain, and polyarticular pain. H/o 2 lumbar surgery and 1 cervical fusion. She reports good improvement from her surgeries. She completed PT with some benefit. She did try prior epidurals prior to her surgery with no improvement, none since. Recent surgery for lumbar hernia 3/3/16. She continues with worsening low back pain. Her symptoms are consistent with piriformis syndrome. Her most recent lumbar CT scan was reviewed and discussed in office today which show some thickening of the left piriformis. I have recommended that we schedule a piriformis injection with Dr. Hope Enrique as soon as possible. We will continue with home exercise/stretching as well. I will have her follow-up in 2 months for reassessment. Because the patient's current regimen places him/her at increased risk for possible overdose, a prescription for naloxone nasal spray is being provided. The patient understands that this medication is only to be used in the setting of a possible overdose and that inadvertent use of this medication could precipitate overt withdrawal.    Medication management consists of Morphine ER 30 mg QD and hydrocodone 10/325 QID PRN. Valium, zolpidem, and Trazadon by her PCP. Medications are helping with pain control and quality of life. Her pain is 3-4/10 with medication and 6-7/10 without. Pt describes pain as aching and burning. Aggravating factors include standing and  walking. Relieved with rest, medication, lying down, and avoiding painful activities. Current treatment is helping to improve general activity, mood, walking, sleep, enjoyment of life. Pt does report constipation but under better control with MiraLAX and conservative treatment. She  is otherwise doing well with no other complaints today.  She denies any adverse events including nausea, vomiting, dizziness, constipation, hallucinations, or seizures. Attended education class 2016    PRIOR IMAGIN. Lumbar CT 16: 1. L3-L4 intervertebral and posterior spinous process fusion. No fracture lumbar spine. Asymmetric thickening of the left piriformis muscle, may be post op or trauma. Allergies   Allergen Reactions    Iodinated Contrast Media - Oral And Iv Dye Anaphylaxis    Compazine [Prochlorperazine Edisylate] Angioedema     Dystonic tongue    Levaquin [Levofloxacin] Hives    Prednisone Anxiety       Past Surgical History:   Procedure Laterality Date    HX APPENDECTOMY      HX CERVICAL DISKECTOMY      HX  SECTION  1987, 93, 95    HX CHOLECYSTECTOMY      HX HYSTERECTOMY      total    HX LUMBAR DISKECTOMY      HX LYSIS OF ADHESIONS      HX ORTHOPAEDIC      back surgery with hardware    HX OTHER SURGICAL  2006    Bladder suspension-          Review of Systems   Constitutional: Negative. Negative for chills and fever. HENT: Negative for congestion and sore throat. Eyes: Negative for blurred vision and double vision. Respiratory: Negative for cough, shortness of breath and wheezing. Cardiovascular: Negative for chest pain and palpitations. Gastrointestinal: Negative for constipation, heartburn, nausea and vomiting. Genitourinary: Negative. Musculoskeletal: Positive for back pain, joint pain, myalgias and neck pain. Skin: Negative for itching and rash. Neurological: Negative for dizziness, seizures, loss of consciousness and headaches. Psychiatric/Behavioral: Negative. Negative for depression, hallucinations and suicidal ideas. The patient is not nervous/anxious. Physical Exam   Constitutional: She is oriented to person, place, and time and well-developed, well-nourished, and in no distress. No distress. HENT:   Head: Normocephalic and atraumatic. Eyes: EOM are normal.   Neck: Normal range of motion. Pulmonary/Chest: Effort normal.   Musculoskeletal: Normal range of motion. Tender palpation in left piriformis aspect with mild spasm noted. Neurological: She is alert and oriented to person, place, and time. She has normal reflexes. Skin: Skin is warm and dry. No rash noted. She is not diaphoretic. No erythema. Psychiatric: Mood, memory, affect and judgment normal.   Nursing note and vitals reviewed. ASSESSMENT:    1. Piriformis syndrome of left side    2. Postlaminectomy syndrome, lumbar region    3. Chronic bilateral low back pain without sciatica    4. Degeneration of lumbar or lumbosacral intervertebral disc    5. Chronic neck pain    6. Sacroiliac joint pain    7. Enthesopathy of hip region, unspecified laterality    8. Lumbar stenosis    9. Bilateral sacroiliitis [address] Prescription Monitoring Program was reviewed which does not demonstrate aberrancies and/or inconsistencies with regard to the historical prescribing of controlled medications to this patient by other providers. NOTE: Disclosed prescriptions for postsurgical pain 3/3, 3/10, 3/18, and 4/2. Cough syrup with hydrocodone 6/14, and 6/23. She reports that she did receive temporary rx for Valium by her PCP filled 11/11. PLAN / Pt Instructions:  1. Continue current plan with no evidence of addiction or diversion. Stable on current medication without adverse events. 2. Refill hydrocodone 10/325 mg up to 4 times daily as needed for pain. 3. Refill  morphine ER 30 mg  once daily. 4. Add naloxone 0.4mg/0.4 mL auto injector for opioid induced respiratory depression emergency only. Extensive counseling was provided regarding this medication. Instructions were given to take home. 5. Schedule left piriformis Injection with Dr. Diana Talley. 6. Add Home exercise program. Exercise/stretching were demonstrated in office today. 7. Discussed risks of addiction, dependency, and opioid induced hyperalgesia.    8. Return to clinic in 2 months    Medications Ordered Today   Medications    morphine CR (MS CONTIN) 30 mg CR tablet     Sig: Take 1 Tab by mouth every twenty-four (24) hours for 30 days. Max Daily Amount: 30 mg. for chronic, severe, refractory pain  Indications: Chronic Pain, NEUROPATHIC PAIN, Severe Pain     Dispense:  30 Tab     Refill:  0    morphine CR (MS CONTIN) 30 mg CR tablet     Sig: Take 1 Tab by mouth every twenty-four (24) hours for 30 days. Max Daily Amount: 30 mg. for chronic, severe, refractory pain  Indications: Chronic Pain, NEUROPATHIC PAIN, Severe Pain     Dispense:  30 Tab     Refill:  0    HYDROcodone-acetaminophen (NORCO)  mg tablet     Sig: Take 1 Tab by mouth four (4) times daily as needed for Pain for up to 30 days. Max Daily Amount: 4 Tabs. Indications: Pain     Dispense:  120 Tab     Refill:  0    HYDROcodone-acetaminophen (NORCO)  mg tablet     Sig: Take 1 Tab by mouth four (4) times daily as needed for Pain for up to 30 days. Max Daily Amount: 4 Tabs. Indications: Pain     Dispense:  120 Tab     Refill:  0    DISCONTD: naloxone 4 mg/actuation spry     Si mg by Nasal route as needed. For emergency use only  Indications: OPIATE-INDUCED RESPIRATORY DEPRESSION     Dispense:  1 Package     Refill:  0    naloxone 4 mg/actuation spry     Si mg by Nasal route as needed. For emergency use only  Indications: OPIATE-INDUCED RESPIRATORY DEPRESSION     Dispense:  1 Package     Refill:  0       Pain medications prescribed with the objective of pain relief and improved physical and psychosocial function in this patient. Spent 25 minutes with patient today reviewing the treatment plan, goals of treatment plan, and limitations of the treatment plan, to include the potential for side effects from medications and procedures. Beecher Favre, [place] 5/3/2017      Note: Please excuse any typographical errors. Voice recognition software was used for this note and may cause mistakes.

## 2017-05-03 NOTE — PROGRESS NOTES
Nursing Notes    Patient presents to the office today in follow-up. Reviewed medications with counts as follows:    Rx Date filled Qty Dispensed Pill Count Last Dose Short   Morphine er 30 mg 4/18/17 30 15 Last night no   norco 10/325 4/18/17 120 66 This am no   Ms. Kenyatta Cisneros has a reminder for a \"due or due soon\" health maintenance. I have asked that she contact her primary care provider for follow-up on this health maintenance. POC UDS was not performed in office today    Any new labs or imaging since last appointment? NO    Have you been to an emergency room (ER) or urgent care clinic since your last visit? NO            Have you been hospitalized since your last visit? NO     If yes, where, when, and reason for visit? Have you seen or consulted any other health care providers outside of the [address] [place] [address]  since your last visit? YES     If yes, where, when, and reason for visit?    Dr Rosenthal Coad

## 2017-05-23 RX ORDER — SODIUM CHLORIDE 0.9 % (FLUSH) 0.9 %
5-10 SYRINGE (ML) INJECTION AS NEEDED
Status: CANCELLED | OUTPATIENT
Start: 2017-05-24

## 2017-05-23 RX ORDER — MIDAZOLAM HYDROCHLORIDE 1 MG/ML
.5-6 INJECTION, SOLUTION INTRAMUSCULAR; INTRAVENOUS
Status: CANCELLED | OUTPATIENT
Start: 2017-05-24

## 2017-05-24 ENCOUNTER — HOSPITAL ENCOUNTER (OUTPATIENT)
Age: 57
Setting detail: OUTPATIENT SURGERY
Discharge: HOME | End: 2017-05-24
Attending: PHYSICAL MEDICINE & REHABILITATION | Admitting: PHYSICAL MEDICINE & REHABILITATION
Payer: OTHER GOVERNMENT

## 2017-05-24 ENCOUNTER — APPOINTMENT (OUTPATIENT)
Dept: GENERAL RADIOLOGY | Age: 57
End: 2017-05-24
Attending: PHYSICAL MEDICINE & REHABILITATION
Payer: OTHER GOVERNMENT

## 2017-05-24 VITALS
SYSTOLIC BLOOD PRESSURE: 97 MMHG | HEART RATE: 71 BPM | TEMPERATURE: 98.3 F | WEIGHT: 172 LBS | BODY MASS INDEX: 27.64 KG/M2 | HEIGHT: 66 IN | DIASTOLIC BLOOD PRESSURE: 62 MMHG | RESPIRATION RATE: 16 BRPM | OXYGEN SATURATION: 95 %

## 2017-05-24 PROCEDURE — 74011000250 HC RX REV CODE- 250

## 2017-05-24 PROCEDURE — 74011250636 HC RX REV CODE- 250/636

## 2017-05-24 PROCEDURE — 77030003666 HC NDL SPINAL BD -A: Performed by: PHYSICAL MEDICINE & REHABILITATION

## 2017-05-24 PROCEDURE — 74011636320 HC RX REV CODE- 636/320

## 2017-05-24 PROCEDURE — 76010000009 HC PAIN MGT 0 TO 30 MIN PROC: Performed by: PHYSICAL MEDICINE & REHABILITATION

## 2017-05-24 PROCEDURE — 99152 MOD SED SAME PHYS/QHP 5/>YRS: CPT | Performed by: PHYSICAL MEDICINE & REHABILITATION

## 2017-05-24 PROCEDURE — 77030003601 HC NDL NRV BLK BBMI -A: Performed by: PHYSICAL MEDICINE & REHABILITATION

## 2017-05-24 PROCEDURE — 77030003672 HC NDL SPN HALY -A: Performed by: PHYSICAL MEDICINE & REHABILITATION

## 2017-05-24 RX ORDER — SODIUM CHLORIDE 0.9 % (FLUSH) 0.9 %
5-10 SYRINGE (ML) INJECTION AS NEEDED
Status: CANCELLED | OUTPATIENT
Start: 2017-05-24

## 2017-05-24 RX ORDER — FENTANYL CITRATE 50 UG/ML
25-400 INJECTION, SOLUTION INTRAMUSCULAR; INTRAVENOUS
Status: DISCONTINUED | OUTPATIENT
Start: 2017-05-24 | End: 2017-05-24 | Stop reason: HOSPADM

## 2017-05-24 RX ORDER — FENTANYL CITRATE 50 UG/ML
INJECTION, SOLUTION INTRAMUSCULAR; INTRAVENOUS AS NEEDED
Status: DISCONTINUED | OUTPATIENT
Start: 2017-05-24 | End: 2017-05-24 | Stop reason: HOSPADM

## 2017-05-24 RX ORDER — MIDAZOLAM HYDROCHLORIDE 1 MG/ML
INJECTION, SOLUTION INTRAMUSCULAR; INTRAVENOUS AS NEEDED
Status: DISCONTINUED | OUTPATIENT
Start: 2017-05-24 | End: 2017-05-24 | Stop reason: HOSPADM

## 2017-05-24 RX ORDER — MIDAZOLAM HYDROCHLORIDE 1 MG/ML
.5-6 INJECTION, SOLUTION INTRAMUSCULAR; INTRAVENOUS
Status: DISCONTINUED | OUTPATIENT
Start: 2017-05-24 | End: 2017-05-24 | Stop reason: HOSPADM

## 2017-05-24 RX ORDER — BETAMETHASONE SODIUM PHOSPHATE AND BETAMETHASONE ACETATE 3; 3 MG/ML; MG/ML
INJECTION, SUSPENSION INTRA-ARTICULAR; INTRALESIONAL; INTRAMUSCULAR; SOFT TISSUE AS NEEDED
Status: DISCONTINUED | OUTPATIENT
Start: 2017-05-24 | End: 2017-05-24 | Stop reason: HOSPADM

## 2017-05-24 RX ORDER — LIDOCAINE HYDROCHLORIDE 10 MG/ML
INJECTION, SOLUTION EPIDURAL; INFILTRATION; INTRACAUDAL; PERINEURAL AS NEEDED
Status: DISCONTINUED | OUTPATIENT
Start: 2017-05-24 | End: 2017-05-24 | Stop reason: HOSPADM

## 2017-05-24 NOTE — H&P (VIEW-ONLY)
Nursing Notes    Patient presents to the office today in follow-up. Reviewed medications with counts as follows:    Rx Date filled Qty Dispensed Pill Count Last Dose Short   Morphine er 30 mg 4/18/17 30 15 Last night no   norco 10/325 4/18/17 120 66 This am no   Ms. Jojo Caldera has a reminder for a \"due or due soon\" health maintenance. I have asked that she contact her primary care provider for follow-up on this health maintenance. POC UDS was not performed in office today    Any new labs or imaging since last appointment? NO    Have you been to an emergency room (ER) or urgent care clinic since your last visit? NO            Have you been hospitalized since your last visit? NO     If yes, where, when, and reason for visit? Have you seen or consulted any other health care providers outside of the [facility]  since your last visit? YES     If yes, where, when, and reason for visit?    Dr Luz Marina Paulino

## 2017-05-24 NOTE — PROCEDURES
[facility]    PIRIFORMIS BLOCK  PROCEDURE REPORT      PATIENT:  Krystian Simon  YOB: 1960  DATE OF SERVICE:  5/24/2017  [facility]    PRE-PROCEDURE DIAGNOSIS:  See Above    POST-PROCEDURE DIAGNOSIS:  See Above                PROCEDURE:    1. Left piriformis block (75280)  2. Fluoroscopic needle guidance (non-spinal) (59948)        3. Supervision of moderate sedation (59050)    ANESTHESIA:  Local with IV moderate sedation. See Medication Administration Record for specific medications and dosage. COMPLICATIONS: None. PHYSICIAN:  Miko Casanova MD    PRE-PROCEDURE NOTE:  Pre-procedural assessment of the patient was performed including a limited history and physical examination. The details of the procedure were discussed with the patient, including the risks, benefits and alternative options and an informed consent was obtained. The patients NPO status, if necessary for the specific procedure and/or administration of moderate intravenous sedation, if utilized, and availability of a responsible adult to escort the patient following the procedure were confirmed. PROCEDURE NOTE:  The patient was brought to the procedure suite and positioned on the fluoroscopy table in the prone position. Physiologic monitors were applied and supplemental oxygen was administered via nasal cannula. The skin was prepped in the standard surgical fashion and sterile drapes were applied over the procedure site. Please refer to the Flowsheet for documentation of the patients vital signs and the Medication Administration Record for any oral and/or intravenous sedation administered prior to or during the procedure. The skin and subcutaneous tissues were infiltrated with 1% Lidocaine.   Under AP fluoroscopic guidance, a 22-gauge 4-inch Stimuplex regional block needle was advanced through the left gluteal muscle, 1cm inferior and 1cm lateral to the inferior border of the left SI joint. Needle guidance initiated with Stimuplex at 1.2mA. Needle was advanced into the left piriformis muscle. No distal left leg stimulation, indicating left sciatic nerve stimulation, was noted. Aspiration was negative. No contrast was injected due to allergy to contrast. Following this, 5 mL of a solution comprised of 3mL 1% lidocaine and 2mL of betamethasone (6mg/ml) was injected slowly through the needle. The needle was cleared of steroid solution and removed. The area was thoroughly cleaned and sterile bandages applied as necessary. The patient tolerated the procedure well and vital signs remained stable throughout the procedure. POST-PROCEDURE COURSE:   The patient was escorted from the procedure suite in satisfactory condition and recovered per facility protocol based on the type of procedure performed and/or the sedation utilized. The patient did not experience any adverse events and remained hemodynamically stable during the post-procedure period. Immediate post-procedure evaluation of the patient demonstrated no lower extremity motor weakness. Pre-procedure pain score was 5/10 and post-procedure pain score was 2/10. DISCHARGE NOTE:  Upon discharge, the patient was able to tolerate fluids and was in no acute distress. The patient was oriented to person, place and time and vital signs were stable. Appropriate post-procedure instructions were provided and explained to the patient in detail and all questions were answered.     Ceci Cantu MD 5/24/2017 8:22 AM

## 2017-05-24 NOTE — DISCHARGE INSTRUCTIONS
[facility]      Post Procedures Instructions    *Resume Diet and Activity as tolerated. Rest for the remainder of the day. *You may fell worse before you feel better as the numbing medications wear off before the steroids take effect if used for your procedures. *Do not use affected extremity until numbness or loss of sensation has completely resolved without assistance. *DO NOT DRIVE, operate machinery/heavey equipment for 24 hours. *DO NOT DRINK ALCOHOL for 24 hours as it may interact with the sedation if you received it and also thins your blood and may cause you to bleed. *WAIT 24 hours before starting back ANY Blood thinning medications:   (Heparin, Coumadin, Warfarin, Lovenox, Plavix, Aggrenox)    *Resume Pre-Procedure Medications as prescribed except Blood Thinners unless directed by your Physician or Cardiologist.     *Avoid Hot tubs and Heating pad for 24 hours to prevent dissipation of medications, you may shower to remove bandages and remaining prep residue on the skin. * If you develop a Headache, drink plenty of fluids including beverages with caffeine (Coffee, Mt. Dew etc.) and rest.  If the headache persists longer than 24 hoursor intensifies - Please call [facility] ([facility]) [phone]    * If you are DIABETIC, check your blood sugar three times a day for the next three days, the steroids will increase your blood sugar. If your blood sugar is greater than 400 have someone drive you to the nearest [address]. * If you experience any of the following problems, call the [facility] [phone] between 8:00 am - 4:30pm or After Hours [phone].     Shortness of breath    Fever of 101 F or higher    Nausea / Vomiting (not normal to you)    Increasing stiffness in the neck    Weakness or numbness in the arms or legs that is not resolving    Prolonged and increasing pain > than 4 days    ANYTHING OUT of the ORDINARY TO YOU    If YOU are experiencing a severe reaction / complication that you have never had before post procedure, call 911 or go to the nearest emergency room! All patients must have a  for transportation [place] regardless if you do or do not receive sedation. DISCHARGE SUMMARY from Nurse      PATIENT INSTRUCTIONS:    After Oral  or intravenous sedation, for 24 hours or while taking prescription Narcotics:  · Limit your activities  · Do not drive and operate hazardous machinery  · Do not make important personal or business decisions  · Do  not drink alcoholic beverages  · If you have not urinated within 8 hours after discharge, please contact your surgeon on call. Report the following to your surgeon:  · Excessive pain, swelling, redness or odor of or around the surgical area  · Temperature over 101  · Nausea and vomiting lasting longer than 4 hours or if unable to take medications  · Any signs of decreased circulation or nerve impairment to extremity: change in color, persistent  numbness, tingling, coldness or increase pain  · Any questions        What to do at Home:  Recommended activity: Activity as tolerated, NO DRIVING FOR 24 Hours post injection          *  Please give a list of your current medications to your Primary Care Provider. *  Please update this list whenever your medications are discontinued, doses are      changed, or new medications (including over-the-counter products) are added. *  Please carry medication information at all times in case of emergency situations. These are general instructions for a healthy lifestyle:    No smoking/ No tobacco products/ Avoid exposure to second hand smoke    Surgeon General's Warning:  Quitting smoking now greatly reduces serious risk to your health.     Obesity, smoking, and sedentary lifestyle greatly increases your risk for illness    A healthy diet, regular physical exercise & weight monitoring are important for maintaining a healthy lifestyle    You may be retaining fluid if you have a history of heart failure or if you experience any of the following symptoms:  Weight gain of 3 pounds or more overnight or 5 pounds in a week, increased swelling in our hands or feet or shortness of breath while lying flat in bed. Please call your doctor as soon as you notice any of these symptoms; do not wait until your next office visit. Recognize signs and symptoms of STROKE:    F-face looks uneven    A-arms unable to move or move unevenly    S-speech slurred or non-existent    T-time-call 911 as soon as signs and symptoms begin-DO NOT go       Back to bed or wait to see if you get better-TIME IS BRAIN.

## 2017-05-24 NOTE — INTERVAL H&P NOTE
H&P Update:  Barber Hanley was seen and examined. History and physical has been reviewed. The patient has been examined.  There have been no significant clinical changes since the completion of the originally dated History and Physical.    Signed By: Jayy Tam MD     May 24, 2017 7:29 AM

## 2017-06-19 ENCOUNTER — DOCUMENTATION ONLY (OUTPATIENT)
Dept: PAIN MANAGEMENT | Age: 57
End: 2017-06-19

## 2017-06-28 ENCOUNTER — OFFICE VISIT (OUTPATIENT)
Dept: PAIN MANAGEMENT | Age: 57
End: 2017-06-28

## 2017-06-28 VITALS
DIASTOLIC BLOOD PRESSURE: 74 MMHG | WEIGHT: 172 LBS | BODY MASS INDEX: 27.76 KG/M2 | HEART RATE: 91 BPM | SYSTOLIC BLOOD PRESSURE: 116 MMHG

## 2017-06-28 DIAGNOSIS — M46.1 BILATERAL SACROILIITIS (HCC): ICD-10-CM

## 2017-06-28 DIAGNOSIS — G57.02 PIRIFORMIS SYNDROME OF LEFT SIDE: ICD-10-CM

## 2017-06-28 DIAGNOSIS — G89.29 CHRONIC BILATERAL LOW BACK PAIN WITHOUT SCIATICA: ICD-10-CM

## 2017-06-28 DIAGNOSIS — M51.37 DEGENERATION OF LUMBAR OR LUMBOSACRAL INTERVERTEBRAL DISC: ICD-10-CM

## 2017-06-28 DIAGNOSIS — M53.3 SACROILIAC JOINT PAIN: ICD-10-CM

## 2017-06-28 DIAGNOSIS — M96.1 POSTLAMINECTOMY SYNDROME, LUMBAR REGION: ICD-10-CM

## 2017-06-28 DIAGNOSIS — M54.50 CHRONIC BILATERAL LOW BACK PAIN WITHOUT SCIATICA: ICD-10-CM

## 2017-06-28 RX ORDER — HYDROCODONE BITARTRATE AND ACETAMINOPHEN 10; 325 MG/1; MG/1
1 TABLET ORAL
Qty: 120 TAB | Refills: 0 | Status: SHIPPED | OUTPATIENT
Start: 2017-08-14 | End: 2017-08-16 | Stop reason: SDUPTHER

## 2017-06-28 RX ORDER — HYDROCODONE BITARTRATE AND ACETAMINOPHEN 10; 325 MG/1; MG/1
1 TABLET ORAL
Qty: 120 TAB | Refills: 0 | Status: SHIPPED | OUTPATIENT
Start: 2017-07-15 | End: 2017-08-16 | Stop reason: SDUPTHER

## 2017-06-28 RX ORDER — MORPHINE SULFATE 30 MG/1
30 TABLET, FILM COATED, EXTENDED RELEASE ORAL EVERY 24 HOURS
Qty: 30 TAB | Refills: 0 | Status: SHIPPED | OUTPATIENT
Start: 2017-08-14 | End: 2017-08-16 | Stop reason: SDUPTHER

## 2017-06-28 RX ORDER — MORPHINE SULFATE 30 MG/1
30 TABLET, FILM COATED, EXTENDED RELEASE ORAL EVERY 24 HOURS
Qty: 30 TAB | Refills: 0 | Status: SHIPPED | OUTPATIENT
Start: 2017-07-15 | End: 2017-08-16 | Stop reason: SDUPTHER

## 2017-06-28 NOTE — PATIENT INSTRUCTIONS
1. Continue current plan with no evidence of addiction or diversion. Stable on current medication without adverse events. 2. Refill hydrocodone 10/325 mg up to 4 times daily as needed for pain. 3. Refill  morphine ER 30 mg  once daily. 4. Naloxone 4 mg nasal spray for opioid induced respiratory depression emergency only. 5. Consider repeat left piriformis Injection with Dr. Mag Lopez if needed   6. Continue Home exercise program.   7. Discussed risks of addiction, dependency, and opioid induced hyperalgesia.    8. Return to clinic in 9-10 weeks with Dr. Octaviano Nelson for POV

## 2017-06-28 NOTE — MR AVS SNAPSHOT
Visit Information Date & Time Provider Department Dept. Phone Encounter #  
 6/28/2017  9:00 AM Uche Soares [facility] [id number] Follow-up Instructions Return in about 10 weeks (around 9/6/2017). Upcoming Health Maintenance Date Due Hepatitis C Screening 1960 Pneumococcal 19-64 Medium Risk (1 of 1 - PPSV23) 4/23/1979 DTaP/Tdap/Td series (1 - Tdap) 4/23/1981 PAP AKA CERVICAL CYTOLOGY 4/23/1981 BREAST CANCER SCRN MAMMOGRAM 4/23/2010 FOBT Q 1 YEAR AGE 50-75 4/23/2010 INFLUENZA AGE 9 TO ADULT 8/1/2017 Allergies as of 6/28/2017  Review Complete On: 6/28/2017 By: PRIYANKA Meeks Severity Noted Reaction Type Reactions Iodinated Contrast- Oral And Iv Dye High 12/19/2012   Side Effect Anaphylaxis Compazine [Prochlorperazine Edisylate]  12/19/2012   Side Effect Angioedema Dystonic tongue Levaquin [Levofloxacin]  12/19/2012    Hives Prednisone  12/19/2012    Anxiety Current Immunizations  Never Reviewed Name Date Influenza Vaccine 10/12/2014 Not reviewed this visit You Were Diagnosed With   
  
 Codes Comments Postlaminectomy syndrome, lumbar region     ICD-10-CM: M96.1 ICD-9-CM: 722.83 Chronic bilateral low back pain without sciatica     ICD-10-CM: M54.5, G89.29 ICD-9-CM: 724.2, 338.29 Degeneration of lumbar or lumbosacral intervertebral disc     ICD-10-CM: M51.37 
ICD-9-CM: 722.52 Sacroiliac joint pain     ICD-10-CM: M53.3 ICD-9-CM: 724.6 Bilateral sacroiliitis (HCC)     ICD-10-CM: M46.1 ICD-9-CM: 720.2 Piriformis syndrome of left side     ICD-10-CM: G57.02 
ICD-9-CM: 355.0 Vitals BP Pulse Weight(growth percentile) BMI OB Status Smoking Status 116/74 91 172 lb (78 kg) 27.76 kg/m2 Hysterectomy Current Every Day Smoker Vitals History BMI and BSA Data Body Mass Index Body Surface Area 27.76 kg/m 2 1.91 m 2 Preferred Pharmacy Pharmacy Name Phone [address] [facility] [phone] Your Updated Medication List  
  
   
This list is accurate as of: 6/28/17  9:54 AM.  Always use your most recent med list.  
  
  
  
  
 AMBIEN 10 mg tablet Generic drug:  zolpidem Take  by mouth nightly as needed for Sleep.  
  
 gabapentin 300 mg capsule Commonly known as:  NEURONTIN Take 300 mg by mouth three (3) times daily. * HYDROcodone-acetaminophen  mg tablet Commonly known as:  Mary Schlichter Take 1 Tab by mouth four (4) times daily as needed for Pain for up to 30 days. Max Daily Amount: 4 Tabs. Indications: Pain Start taking on:  7/15/2017  
  
 * HYDROcodone-acetaminophen  mg tablet Commonly known as:  Mary Schlichter Take 1 Tab by mouth four (4) times daily as needed for Pain for up to 30 days. Max Daily Amount: 4 Tabs. Indications: Pain Start taking on:  8/14/2017 * morphine CR 30 mg CR tablet Commonly known as:  MS CONTIN Take 1 Tab by mouth every twenty-four (24) hours for 30 days. Max Daily Amount: 30 mg. for chronic, severe, refractory pain  Indications: Chronic Pain, NEUROPATHIC PAIN, Severe Pain Start taking on:  7/15/2017 * morphine CR 30 mg CR tablet Commonly known as:  MS CONTIN Take 1 Tab by mouth every twenty-four (24) hours for 30 days. Max Daily Amount: 30 mg. for chronic, severe, refractory pain  Indications: Chronic Pain, NEUROPATHIC PAIN, Severe Pain Start taking on:  8/14/2017  
  
 naloxone 4 mg/actuation Spry 4 mg by Nasal route as needed. For emergency use only  Indications: OPIATE-INDUCED RESPIRATORY DEPRESSION  
  
 traZODone 100 mg tablet Commonly known as:  Alysha Foil Take 2 Tabs by mouth nightly. For chronic insomnia * Notice: This list has 4 medication(s) that are the same as other medications prescribed for you.  Read the directions carefully, and ask your doctor or other care provider to review them with you. Prescriptions Printed Refills  
 morphine CR (MS CONTIN) 30 mg CR tablet 0 Starting on: 7/15/2017 Sig: Take 1 Tab by mouth every twenty-four (24) hours for 30 days. Max Daily Amount: 30 mg. for chronic, severe, refractory pain  Indications: Chronic Pain, NEUROPATHIC PAIN, Severe Pain Class: Print Route: Oral  
 morphine CR (MS CONTIN) 30 mg CR tablet 0 Starting on: 8/14/2017 Sig: Take 1 Tab by mouth every twenty-four (24) hours for 30 days. Max Daily Amount: 30 mg. for chronic, severe, refractory pain  Indications: Chronic Pain, NEUROPATHIC PAIN, Severe Pain Class: Print Route: Oral  
 HYDROcodone-acetaminophen (NORCO)  mg tablet 0 Starting on: 7/15/2017 Sig: Take 1 Tab by mouth four (4) times daily as needed for Pain for up to 30 days. Max Daily Amount: 4 Tabs. Indications: Pain Class: Print Route: Oral  
 HYDROcodone-acetaminophen (NORCO)  mg tablet 0 Starting on: 8/14/2017 Sig: Take 1 Tab by mouth four (4) times daily as needed for Pain for up to 30 days. Max Daily Amount: 4 Tabs. Indications: Pain Class: Print Route: Oral  
  
Follow-up Instructions Return in about 10 weeks (around 9/6/2017). Patient Instructions 1. Continue current plan with no evidence of addiction or diversion. Stable on current medication without adverse events. 2. Refill hydrocodone 10/325 mg up to 4 times daily as needed for pain. 3. Refill  morphine ER 30 mg  once daily. 4. Naloxone 4 mg nasal spray for opioid induced respiratory depression emergency only. 5. Consider repeat left piriformis Injection with Dr. Slade Garrison if needed 6. Continue Home exercise program.  
7. Discussed risks of addiction, dependency, and opioid induced hyperalgesia. 8. Return to clinic in 9-10 weeks with Dr. Isabel Dupont for POV Introducing [facility]! Dear Whitney Rosas: Thank you for requesting a [employer] account. Our records indicate that you already have an active [employer] account. You can access your account anytime at [email or web address] Did you know that you can access your hospital and ER discharge instructions at any time in [employer]? You can also review all of your test results from your hospital stay or ER visit. Additional Information If you have questions, please visit the Frequently Asked Questions section of the [employer] website at [email or web address] Remember, [employer] is NOT to be used for urgent needs. For medical emergencies, dial 911. Now available from your iPhone and Android! Please provide this summary of care documentation to your next provider. Your primary care clinician is listed as Brendan Lomas. If you have any questions after today's visit, please call [phone].

## 2017-06-28 NOTE — PROGRESS NOTES
HISTORY OF PRESENT ILLNESS  Josiah Anand is a 62 y.o. female    HPI: Ms. Berkley Mason  returns today for f/u of chronic low back pain, Neck pain, and polyarticular pain. H/o 2 lumbar surgery and 1 cervical fusion. She reports good improvement from her surgeries. She completed PT with some benefit. She did try prior epidurals prior to her surgery with no improvement, none since. Recent surgery for lumbar hernia 3/3/16. She is doing well currently. She received a Left piriformis injection by Dr. Yesenia Camacho on 5/24/2017 with good improvement. She was doing well following her injection but then reports that she spent 15 hours riding in a car 2 times in 1 week. She then felt a pop in her left hip. She followed up with her PCP who ordered a left hip MRI which was unremarkable. She points to her left SI aspect as the area of her pain. She has says that her pain has improved since that time. We reviewed her exercise/stretching for SI joint. We will continue with her current treatment plan. Consider repeating piriformis injection with Dr. Yesenia Camacho when necessary. She is otherwise doing well with no other complaints today. She is planning to return to [place] where she has bought a vacation home. She will be leaving on July 5 and will be out of her medication by her refill date on August 14. We discussed this in detail today. She will be out of her medication for 4 days. I have asked her to stretch her medications out so that she does not run out of her medication. She verbally agrees. I will have her follow-up with Dr. Jose Luis Gregg in 9-10 weeks for physician oversight visit. She continues with worsening low back pain. Her symptoms are consistent with piriformis syndrome. Her most recent lumbar CT scan was reviewed and discussed in office today which show some thickening of the left piriformis. I have recommended that we schedule a piriformis injection with Dr. Yesenia Camacho as soon as possible.   We will continue with home exercise/stretching as well. I will have her follow-up in 2 months for reassessment. Medication management consists of Morphine ER 30 mg QD and hydrocodone 10/325 QID PRN. Valium, zolpidem, and Trazadon by her PCP. Medications are helping with pain control and quality of life. Her pain is 3-4/10 with medication and 6-7/10 without. Pt describes pain as aching and burning. Aggravating factors include standing and  walking. Relieved with rest, medication, lying down, and avoiding painful activities. Current treatment is helping to improve general activity, mood, walking, sleep, enjoyment of life. Pt does report constipation but under better control with MiraLAX and conservative treatment. She  is otherwise doing well with no other complaints today. She denies any adverse events including nausea, vomiting, dizziness, constipation, hallucinations, or seizures. Because the patient's current regimen places him/her at increased risk for possible overdose, a prescription for naloxone nasal spray has been provided. The patient understands that this medication is only to be used in the setting of a possible overdose and that inadvertent use of this medication could precipitate overt withdrawal.      Attended education class 2016    PRIOR IMAGIN. Lumbar CT 16: 1. L3-L4 intervertebral and posterior spinous process fusion. No fracture lumbar spine. Asymmetric thickening of the left piriformis muscle, may be post op or trauma.        Allergies   Allergen Reactions    Iodinated Contrast- Oral And Iv Dye Anaphylaxis    Compazine [Prochlorperazine Edisylate] Angioedema     Dystonic tongue    Levaquin [Levofloxacin] Hives    Prednisone Anxiety       Past Surgical History:   Procedure Laterality Date    HX APPENDECTOMY      HX CERVICAL DISKECTOMY      HX  SECTION  , ,     HX CHOLECYSTECTOMY      HX HYSTERECTOMY      total    HX LUMBAR DISKECTOMY      HX LYSIS OF ADHESIONS  2010    HX ORTHOPAEDIC      back surgery with hardware    HX OTHER SURGICAL  2006    Bladder suspension-          Review of Systems   Constitutional: Negative. Negative for chills and fever. HENT: Negative for congestion and sore throat. Eyes: Negative for blurred vision and double vision. Respiratory: Negative for cough, shortness of breath and wheezing. Cardiovascular: Negative for chest pain and palpitations. Gastrointestinal: Negative for constipation, heartburn, nausea and vomiting. Genitourinary: Negative. Musculoskeletal: Positive for back pain, joint pain, myalgias and neck pain. Skin: Negative for itching and rash. Neurological: Negative for dizziness, seizures, loss of consciousness and headaches. Psychiatric/Behavioral: Negative. Negative for depression, hallucinations and suicidal ideas. The patient is not nervous/anxious. Physical Exam   Constitutional: She is oriented to person, place, and time and well-developed, well-nourished, and in no distress. No distress. HENT:   Head: Normocephalic and atraumatic. Eyes: EOM are normal.   Neck: Normal range of motion. Pulmonary/Chest: Effort normal.   Musculoskeletal: Normal range of motion. Tender palpation in left piriformis aspect with mild spasm noted. Neurological: She is alert and oriented to person, place, and time. She has normal reflexes. Skin: Skin is warm and dry. No rash noted. She is not diaphoretic. No erythema. Psychiatric: Mood, memory, affect and judgment normal.   Nursing note and vitals reviewed. ASSESSMENT:    1. Postlaminectomy syndrome, lumbar region    2. Chronic bilateral low back pain without sciatica    3. Degeneration of lumbar or lumbosacral intervertebral disc    4. Sacroiliac joint pain    5. Bilateral sacroiliitis ([address]    6.  Piriformis syndrome of left side         [place] Prescription Monitoring Program was reviewed which does not demonstrate aberrancies and/or inconsistencies with regard to the historical prescribing of controlled medications to this patient by other providers. NOTE: Disclosed prescriptions for postsurgical pain 3/3, 3/10, 3/18, and 4/2. Cough syrup with hydrocodone 6/14, and 6/23. She reports that she did receive temporary rx for Valium by her PCP filled 11/11. PLAN / Pt Instructions:  1. Continue current plan with no evidence of addiction or diversion. Stable on current medication without adverse events. 2. Refill hydrocodone 10/325 mg up to 4 times daily as needed for pain. 3. Refill  morphine ER 30 mg  once daily. 4. Naloxone 4 mg nasal spray for opioid induced respiratory depression emergency only. 5. Consider repeat left piriformis Injection with Dr. Laura Amanda if needed   6. Continue Home exercise program.   7. Discussed risks of addiction, dependency, and opioid induced hyperalgesia. 8. Return to clinic in 9-10 weeks with Dr. Vee Blood    Medications Ordered Today   Medications    morphine CR (MS CONTIN) 30 mg CR tablet     Sig: Take 1 Tab by mouth every twenty-four (24) hours for 30 days. Max Daily Amount: 30 mg. for chronic, severe, refractory pain  Indications: Chronic Pain, NEUROPATHIC PAIN, Severe Pain     Dispense:  30 Tab     Refill:  0    morphine CR (MS CONTIN) 30 mg CR tablet     Sig: Take 1 Tab by mouth every twenty-four (24) hours for 30 days. Max Daily Amount: 30 mg. for chronic, severe, refractory pain  Indications: Chronic Pain, NEUROPATHIC PAIN, Severe Pain     Dispense:  30 Tab     Refill:  0    HYDROcodone-acetaminophen (NORCO)  mg tablet     Sig: Take 1 Tab by mouth four (4) times daily as needed for Pain for up to 30 days. Max Daily Amount: 4 Tabs. Indications: Pain     Dispense:  120 Tab     Refill:  0    HYDROcodone-acetaminophen (NORCO)  mg tablet     Sig: Take 1 Tab by mouth four (4) times daily as needed for Pain for up to 30 days. Max Daily Amount: 4 Tabs.  Indications: Pain Dispense:  120 Tab     Refill:  0       Pain medications prescribed with the objective of pain relief and improved physical and psychosocial function in this patient. Spent 25 minutes with patient today reviewing the treatment plan, goals of treatment plan, and limitations of the treatment plan, to include the potential for side effects from medications and procedures. Lily Correa 6/28/2017      Note: Please excuse any typographical errors. Voice recognition software was used for this note and may cause mistakes.

## 2017-06-28 NOTE — PROGRESS NOTES
Nursing Notes    Patient presents to the office today in follow-up. Patient rates her pain at 6/10 on the numerical pain scale. Reviewed medications with counts as follows:    Rx Date filled Qty Dispensed Pill Count Last Dose Short   Morphine sulfate 30mg ER 06/16/17 30 18 Yesterday  No    norco 10/325mg  06/16/17 120 72 Yesterday  No                                 Comments:     POC UDS was not performed in office today    Any new labs or imaging since last appointment? YES; labwork at emergency dept : MRI of left hip     Have you been to an emergency room (ER) or urgent care clinic since your last visit? YES; [facility] emergency dept due to left hip injury             Have you been hospitalized since your last visit? NO     If yes, where, when, and reason for visit? Have you seen or consulted any other health care providers outside of the [address] [place] [address]  since your last visit? YES     If yes, where, when, and reason for visit? pcp and emergency dept physicians         HM deferred to pcp.

## 2017-08-16 ENCOUNTER — OFFICE VISIT (OUTPATIENT)
Dept: PAIN MANAGEMENT | Age: 57
End: 2017-08-16

## 2017-08-16 VITALS
SYSTOLIC BLOOD PRESSURE: 145 MMHG | DIASTOLIC BLOOD PRESSURE: 86 MMHG | HEART RATE: 77 BPM | HEIGHT: 66 IN | WEIGHT: 172 LBS | BODY MASS INDEX: 27.64 KG/M2 | TEMPERATURE: 97.5 F | RESPIRATION RATE: 18 BRPM

## 2017-08-16 DIAGNOSIS — M47.812 OSTEOARTHRITIS OF CERVICAL SPINE, UNSPECIFIED SPINAL OSTEOARTHRITIS COMPLICATION STATUS: ICD-10-CM

## 2017-08-16 DIAGNOSIS — Z79.899 ENCOUNTER FOR LONG-TERM (CURRENT) USE OF HIGH-RISK MEDICATION: Primary | ICD-10-CM

## 2017-08-16 DIAGNOSIS — G57.02 PIRIFORMIS SYNDROME OF LEFT SIDE: ICD-10-CM

## 2017-08-16 DIAGNOSIS — M54.50 CHRONIC BILATERAL LOW BACK PAIN WITHOUT SCIATICA: ICD-10-CM

## 2017-08-16 DIAGNOSIS — M54.2 CHRONIC NECK PAIN: ICD-10-CM

## 2017-08-16 DIAGNOSIS — G89.29 CHRONIC NECK PAIN: ICD-10-CM

## 2017-08-16 DIAGNOSIS — M51.37 DEGENERATION OF LUMBAR OR LUMBOSACRAL INTERVERTEBRAL DISC: ICD-10-CM

## 2017-08-16 DIAGNOSIS — M46.1 BILATERAL SACROILIITIS (HCC): ICD-10-CM

## 2017-08-16 DIAGNOSIS — M48.061 LUMBAR STENOSIS: ICD-10-CM

## 2017-08-16 DIAGNOSIS — G89.29 CHRONIC BILATERAL LOW BACK PAIN WITHOUT SCIATICA: ICD-10-CM

## 2017-08-16 DIAGNOSIS — M96.1 POSTLAMINECTOMY SYNDROME, LUMBAR REGION: ICD-10-CM

## 2017-08-16 LAB
ALCOHOL UR POC: NORMAL
AMPHETAMINES UR POC: NORMAL
BARBITURATES UR POC: NORMAL
BENZODIAZEPINES UR POC: NORMAL
BUPRENORPHINE UR POC: NORMAL
CANNABINOIDS UR POC: NORMAL
CARISOPRODOL UR POC: NORMAL
COCAINE UR POC: NORMAL
FENTANYL UR POC: NORMAL
MDMA/ECSTASY UR POC: NORMAL
METHADONE UR POC: NORMAL
METHAMPHETAMINE UR POC: NORMAL
METHYLPHENIDATE UR POC: NORMAL
OPIATES UR POC: NORMAL
OXYCODONE UR POC: NORMAL
PHENCYCLIDINE UR POC: NORMAL
PROPOXYPHENE UR POC: NORMAL
TRAMADOL UR POC: NORMAL
TRICYCLICS UR POC: NORMAL

## 2017-08-16 RX ORDER — MORPHINE SULFATE 30 MG/1
30 TABLET, FILM COATED, EXTENDED RELEASE ORAL EVERY 24 HOURS
Qty: 30 TAB | Refills: 0 | Status: SHIPPED | OUTPATIENT
Start: 2017-09-13 | End: 2017-10-11 | Stop reason: SDUPTHER

## 2017-08-16 RX ORDER — HYDROCODONE BITARTRATE AND ACETAMINOPHEN 10; 325 MG/1; MG/1
1 TABLET ORAL
Qty: 120 TAB | Refills: 0 | Status: SHIPPED | OUTPATIENT
Start: 2017-10-12 | End: 2017-10-11 | Stop reason: SDUPTHER

## 2017-08-16 RX ORDER — HYDROCODONE BITARTRATE AND ACETAMINOPHEN 10; 325 MG/1; MG/1
1 TABLET ORAL
Qty: 120 TAB | Refills: 0 | Status: SHIPPED | OUTPATIENT
Start: 2017-09-13 | End: 2017-10-11 | Stop reason: SDUPTHER

## 2017-08-16 RX ORDER — MORPHINE SULFATE 30 MG/1
30 TABLET, FILM COATED, EXTENDED RELEASE ORAL EVERY 24 HOURS
Qty: 30 TAB | Refills: 0 | Status: SHIPPED | OUTPATIENT
Start: 2017-10-12 | End: 2017-10-11 | Stop reason: SDUPTHER

## 2017-08-16 NOTE — PROGRESS NOTES
HISTORY OF PRESENT ILLNESS  Moses Grimes is a 62 y.o. female    HPI: Ms. Jonnie Hernandez  returns today for f/u of chronic low back pain, Neck pain, and polyarticular pain. H/o 2 lumbar surgery and 1 cervical fusion. She reports good improvement from her surgeries. She completed PT with some benefit. She did try prior epidurals prior to her surgery with no improvement, none since. Recent surgery for lumbar hernia 3/3/16. Left piriformis injection by Dr. Amparo Schmitt on 5/24/2017 with good improvement    She reports some improvement since last visit. During her last visit she had reported some aggravating hip pain associated with long car ride. She recently received left piriformis injection by Dr. Amparo Schmitt on 5/24/2017 with good improvement. She is happy with her current treatment plan. We will continue with no changes today. She is currently scheduled to follow-up with Dr. Phillip Whalen next visit for physician oversight visit. Medication management consists of Morphine ER 30 mg QD and hydrocodone 10/325 QID PRN. Valium, zolpidem, and Trazadon by her PCP. Medications are helping with pain control and quality of life. Her pain is 3-4/10 with medication and 6-7/10 without. Pt describes pain as aching and burning. Aggravating factors include standing and  walking. Relieved with rest, medication, lying down, and avoiding painful activities. Current treatment is helping to improve general activity, mood, walking, sleep, enjoyment of life. Pt does report constipation but under better control with MiraLAX and conservative treatment. She  is otherwise doing well with no other complaints today. She denies any adverse events including nausea, vomiting, dizziness, constipation, hallucinations, or seizures. Because the patient's current regimen places him/her at increased risk for possible overdose, a prescription for naloxone nasal spray has been provided.   The patient understands that this medication is only to be used in the setting of a possible overdose and that inadvertent use of this medication could precipitate overt withdrawal.    Attended education class 2016      PRIOR IMAGIN. Lumbar CT 16: 1. L3-L4 intervertebral and posterior spinous process fusion. No fracture lumbar spine. Asymmetric thickening of the left piriformis muscle, may be post op or trauma. Allergies   Allergen Reactions    Iodinated Contrast- Oral And Iv Dye Anaphylaxis    Compazine [Prochlorperazine Edisylate] Angioedema     Dystonic tongue    Levaquin [Levofloxacin] Hives    Prednisone Anxiety       Past Surgical History:   Procedure Laterality Date    HX APPENDECTOMY      HX CERVICAL DISKECTOMY      HX  SECTION  , ,     HX CHOLECYSTECTOMY      HX HYSTERECTOMY      total    HX LUMBAR DISKECTOMY      HX LYSIS OF ADHESIONS      HX ORTHOPAEDIC      back surgery with hardware    HX OTHER SURGICAL  2006    Bladder suspension-          Review of Systems   Constitutional: Negative. Negative for chills and fever. HENT: Negative for congestion and sore throat. Eyes: Negative for blurred vision and double vision. Respiratory: Negative for cough, shortness of breath and wheezing. Cardiovascular: Negative for chest pain and palpitations. Gastrointestinal: Negative for constipation, heartburn, nausea and vomiting. Genitourinary: Negative. Musculoskeletal: Positive for back pain, joint pain, myalgias and neck pain. Skin: Negative for itching and rash. Neurological: Negative for dizziness, seizures, loss of consciousness and headaches. Psychiatric/Behavioral: Negative. Negative for depression, hallucinations and suicidal ideas. The patient is not nervous/anxious. Physical Exam   Constitutional: She is oriented to person, place, and time and well-developed, well-nourished, and in no distress. No distress. HENT:   Head: Normocephalic and atraumatic.    Eyes: EOM are normal.   Neck: Normal range of motion. Pulmonary/Chest: Effort normal.   Musculoskeletal: Normal range of motion. Tender palpation in left piriformis aspect with mild spasm noted. Neurological: She is alert and oriented to person, place, and time. She has normal reflexes. Skin: Skin is warm and dry. No rash noted. She is not diaphoretic. No erythema. Psychiatric: Mood, memory, affect and judgment normal.   Nursing note and vitals reviewed. ASSESSMENT:    1. Encounter for long-term (current) use of high-risk medication    2. Postlaminectomy syndrome, lumbar region    3. Chronic bilateral low back pain without sciatica    4. Degeneration of lumbar or lumbosacral intervertebral disc    5. Chronic neck pain    6. Osteoarthritis of cervical spine, unspecified spinal osteoarthritis complication status    7. Lumbar stenosis    8. Bilateral sacroiliitis ([facility] [address]    9. Piriformis syndrome of left side         [place] Prescription Monitoring Program was reviewed which does not demonstrate aberrancies and/or inconsistencies with regard to the historical prescribing of controlled medications to this patient by other providers. NOTE: Disclosed prescriptions for postsurgical pain 3/3, 3/10, 3/18, and 4/2. Cough syrup with hydrocodone 6/14, and 6/23. She reports that she did receive temporary rx for Valium by her PCP filled 11/11. PLAN / Pt Instructions:  1. Continue current plan with no evidence of addiction or diversion. Stable on current medication without adverse events. 2. Refill hydrocodone 10/325 mg up to 4 times daily as needed for pain. 3. Refill  morphine ER 30 mg  once daily. 4. Naloxone 4 mg nasal spray for opioid induced respiratory depression emergency only. 5. Consider repeat left piriformis Injection with Dr. Rohini Deluca if needed   6. Continue Home exercise program.   7. Discussed risks of addiction, dependency, and opioid induced hyperalgesia.    8. Currently has follow-up appointment with Dr. Cammie Maldonado for POV    Medications Ordered Today   Medications    morphine CR (MS CONTIN) 30 mg CR tablet     Sig: Take 1 Tab by mouth every twenty-four (24) hours for 30 days. Max Daily Amount: 30 mg. for chronic, severe, refractory pain  Indications: Chronic Pain, NEUROPATHIC PAIN, Severe Pain     Dispense:  30 Tab     Refill:  0    morphine CR (MS CONTIN) 30 mg CR tablet     Sig: Take 1 Tab by mouth every twenty-four (24) hours for 30 days. Max Daily Amount: 30 mg. for chronic, severe, refractory pain  Indications: Chronic Pain, NEUROPATHIC PAIN, Severe Pain     Dispense:  30 Tab     Refill:  0    HYDROcodone-acetaminophen (NORCO)  mg tablet     Sig: Take 1 Tab by mouth four (4) times daily as needed for Pain for up to 30 days. Max Daily Amount: 4 Tabs. Indications: Pain     Dispense:  120 Tab     Refill:  0    HYDROcodone-acetaminophen (NORCO)  mg tablet     Sig: Take 1 Tab by mouth four (4) times daily as needed for Pain for up to 30 days. Max Daily Amount: 4 Tabs. Indications: Pain     Dispense:  120 Tab     Refill:  0       Pain medications prescribed with the objective of pain relief and improved physical and psychosocial function in this patient. Spent 25 minutes with patient today reviewing the treatment plan, goals of treatment plan, and limitations of the treatment plan, to include the potential for side effects from medications and procedures. Lily Kellogg 8/16/2017      Note: Please excuse any typographical errors. Voice recognition software was used for this note and may cause mistakes.

## 2017-08-16 NOTE — MR AVS SNAPSHOT
Visit Information Date & Time Provider Department Dept. Phone Encounter #  
 8/16/2017  8:40 AM PRIYANKA Bell [facility] [phone] [id number] Your Appointments 9/27/2017  9:20 AM  
Follow Up with Shahram Sky MD  
[facility] Appt Note: Per  schedule 3months F/U  w/Dr [facility] (DP/SNF)  
 [address] [facility] [address] [phone] Donna Kilgore [address] [id number]  
  
    
 10/11/2017  9:00 AM  
Follow Up with PRIYANKA Bell [facility] ([facility] SCHEDULING) Appt Note: return in 2 months [address] [facility] [address] [phone] Donna Kilgore [id number] [id number] Upcoming Health Maintenance Date Due Hepatitis C Screening 1960 Pneumococcal 19-64 Medium Risk (1 of 1 - PPSV23) 4/23/1979 DTaP/Tdap/Td series (1 - Tdap) 4/23/1981 PAP AKA CERVICAL CYTOLOGY 4/23/1981 BREAST CANCER SCRN MAMMOGRAM 4/23/2010 FOBT Q 1 YEAR AGE 50-75 4/23/2010 INFLUENZA AGE 9 TO ADULT 8/1/2017 Allergies as of 8/16/2017  Review Complete On: 8/16/2017 By: PRIYANKA Bell Severity Noted Reaction Type Reactions Iodinated Contrast- Oral And Iv Dye High 12/19/2012   Side Effect Anaphylaxis Compazine [Prochlorperazine Edisylate]  12/19/2012   Side Effect Angioedema Dystonic tongue Levaquin [Levofloxacin]  12/19/2012    Hives Prednisone  12/19/2012    Anxiety Current Immunizations  Never Reviewed Name Date Influenza Vaccine 10/12/2014 Not reviewed this visit You Were Diagnosed With   
  
 Codes Comments Encounter for long-term (current) use of high-risk medication    -  Primary ICD-10-CM: Y00.443 ICD-9-CM: V58.69 Postlaminectomy syndrome, lumbar region     ICD-10-CM: M96.1 ICD-9-CM: 722.83   
 Chronic bilateral low back pain without sciatica     ICD-10-CM: M54.5, G89.29 ICD-9-CM: 724.2, 338.29 Degeneration of lumbar or lumbosacral intervertebral disc     ICD-10-CM: M51.37 
ICD-9-CM: 722.52 Chronic neck pain     ICD-10-CM: M54.2, G89.29 ICD-9-CM: 723.1, 338.29 Osteoarthritis of cervical spine, unspecified spinal osteoarthritis complication status     KJJ-18-FP: R47.490 ICD-9-CM: 721.0 Lumbar stenosis     ICD-10-CM: M48.06 
ICD-9-CM: 724.02 Bilateral sacroiliitis (HCC)     ICD-10-CM: M46.1 ICD-9-CM: 720.2 Piriformis syndrome of left side     ICD-10-CM: G57.02 
ICD-9-CM: 355.0 Vitals BP Pulse Temp Resp Height(growth percentile) Weight(growth percentile) 145/86 77 97.5 °F (36.4 °C) 18 5' 6\" (1.676 m) 172 lb (78 kg) BMI OB Status Smoking Status 27.76 kg/m2 Hysterectomy Current Every Day Smoker BMI and BSA Data Body Mass Index Body Surface Area  
 27.76 kg/m 2 1.91 m 2 Preferred Pharmacy Pharmacy Name Phone [address] [facility] [phone] Your Updated Medication List  
  
   
This list is accurate as of: 8/16/17  9:25 AM.  Always use your most recent med list.  
  
  
  
  
 AMBIEN 10 mg tablet Generic drug:  zolpidem Take  by mouth nightly as needed for Sleep.  
  
 gabapentin 300 mg capsule Commonly known as:  NEURONTIN Take 300 mg by mouth three (3) times daily. * HYDROcodone-acetaminophen  mg tablet Commonly known as:  Tameka [place] Take 1 Tab by mouth four (4) times daily as needed for Pain for up to 30 days. Max Daily Amount: 4 Tabs. Indications: Pain Start taking on:  9/13/2017  
  
 * HYDROcodone-acetaminophen  mg tablet Commonly known as:  Tameka [place] Take 1 Tab by mouth four (4) times daily as needed for Pain for up to 30 days. Max Daily Amount: 4 Tabs. Indications: Pain Start taking on:  10/12/2017 * morphine CR 30 mg CR tablet Commonly known as:  MS CONTIN Take 1 Tab by mouth every twenty-four (24) hours for 30 days. Max Daily Amount: 30 mg. for chronic, severe, refractory pain  Indications: Chronic Pain, NEUROPATHIC PAIN, Severe Pain Start taking on:  9/13/2017 * morphine CR 30 mg CR tablet Commonly known as:  MS CONTIN Take 1 Tab by mouth every twenty-four (24) hours for 30 days. Max Daily Amount: 30 mg. for chronic, severe, refractory pain  Indications: Chronic Pain, NEUROPATHIC PAIN, Severe Pain Start taking on:  10/12/2017  
  
 naloxone 4 mg/actuation Spry 4 mg by Nasal route as needed. For emergency use only  Indications: OPIATE-INDUCED RESPIRATORY DEPRESSION  
  
 traZODone 100 mg tablet Commonly known as:  Illene Dus Take 2 Tabs by mouth nightly. For chronic insomnia * Notice: This list has 4 medication(s) that are the same as other medications prescribed for you. Read the directions carefully, and ask your doctor or other care provider to review them with you. Prescriptions Printed Refills  
 morphine CR (MS CONTIN) 30 mg CR tablet 0 Starting on: 9/13/2017 Sig: Take 1 Tab by mouth every twenty-four (24) hours for 30 days. Max Daily Amount: 30 mg. for chronic, severe, refractory pain  Indications: Chronic Pain, NEUROPATHIC PAIN, Severe Pain Class: Print Route: Oral  
 morphine CR (MS CONTIN) 30 mg CR tablet 0 Starting on: 10/12/2017 Sig: Take 1 Tab by mouth every twenty-four (24) hours for 30 days. Max Daily Amount: 30 mg. for chronic, severe, refractory pain  Indications: Chronic Pain, NEUROPATHIC PAIN, Severe Pain Class: Print Route: Oral  
 HYDROcodone-acetaminophen (NORCO)  mg tablet 0 Starting on: 9/13/2017 Sig: Take 1 Tab by mouth four (4) times daily as needed for Pain for up to 30 days. Max Daily Amount: 4 Tabs. Indications: Pain Class: Print Route: Oral  
 HYDROcodone-acetaminophen (NORCO)  mg tablet 0 Starting on: 10/12/2017 Sig: Take 1 Tab by mouth four (4) times daily as needed for Pain for up to 30 days. Max Daily Amount: 4 Tabs. Indications: Pain Class: Print Route: Oral  
  
We Performed the Following AMB POC DRUG SCREEN () [ \[facility] [place] [facility]\""] DRUG SCREEN [[id number] Custom] Patient Instructions 1. Continue current plan with no evidence of addiction or diversion. Stable on current medication without adverse events. 2. Refill hydrocodone 10/325 mg up to 4 times daily as needed for pain. 3. Refill  morphine ER 30 mg  once daily. 4. Naloxone 4 mg nasal spray for opioid induced respiratory depression emergency only. 5. Consider repeat left piriformis Injection with Dr. Leonial Ramirez if needed 6. Continue Home exercise program.  
7. Discussed risks of addiction, dependency, and opioid induced hyperalgesia. 8. Currently has follow-up appointment with Dr. Lorraine Mesa for POV Introducing [facility] SERVICES! Dear Rodriguez lora: Thank you for requesting a zuuka! account. Our records indicate that you already have an active zuuka! account. You can access your account anytime at [email or web address] Did you know that you can access your hospital and ER discharge instructions at any time in zuuka!? You can also review all of your test results from your hospital stay or ER visit. Additional Information If you have questions, please visit the Frequently Asked Questions section of the zuuka! website at [email or web address] Remember, zuuka! is NOT to be used for urgent needs. For medical emergencies, dial 911. Now available from your iPhone and Android! Please provide this summary of care documentation to your next provider. Your primary care clinician is listed as Jose J Calles. If you have any questions after today's visit, please call [phone].

## 2017-08-16 NOTE — ACP (ADVANCE CARE PLANNING)
Pt does not have an advanced medical directive, POA, or living will. She is not interested in completing this today.

## 2017-08-16 NOTE — PATIENT INSTRUCTIONS
1. Continue current plan with no evidence of addiction or diversion. Stable on current medication without adverse events. 2. Refill hydrocodone 10/325 mg up to 4 times daily as needed for pain. 3. Refill  morphine ER 30 mg  once daily. 4. Naloxone 4 mg nasal spray for opioid induced respiratory depression emergency only. 5. Consider repeat left piriformis Injection with Dr. Jordon Mcginnis if needed   6. Continue Home exercise program.   7. Discussed risks of addiction, dependency, and opioid induced hyperalgesia.    8. Currently has follow-up appointment with Dr. Cammie Maldonado for POV

## 2017-08-16 NOTE — PROGRESS NOTES
Nursing Notes    Patient presents to the office today in follow-up. Patient rates her pain at 4/10 on the numerical pain scale. Reviewed medications with counts as follows:    Rx Date filled Qty Dispensed Pill Count Last Dose Short   norco 10/325 mg 08/14/17 120 118 yesterday no   Morphine sulfate ER 30 mg 08/14/17 30 29 yesterday no                          POC UDS was performed in office today    Any new labs or imaging since last appointment? NO    Have you been to an emergency room (ER) or urgent care clinic since your last visit? NO            Have you been hospitalized since your last visit? NO     If yes, where, when, and reason for visit? Have you seen or consulted any other health care providers outside of the [address] [place] [address]  since your last visit? NO     If yes, where, when, and reason for visit? HM deferred to pcp.

## 2017-10-11 ENCOUNTER — OFFICE VISIT (OUTPATIENT)
Dept: PAIN MANAGEMENT | Age: 57
End: 2017-10-11

## 2017-10-11 VITALS
SYSTOLIC BLOOD PRESSURE: 131 MMHG | TEMPERATURE: 96.9 F | HEART RATE: 79 BPM | DIASTOLIC BLOOD PRESSURE: 77 MMHG | HEIGHT: 66 IN | BODY MASS INDEX: 27.64 KG/M2 | WEIGHT: 172 LBS

## 2017-10-11 DIAGNOSIS — G89.29 CHRONIC NECK PAIN: ICD-10-CM

## 2017-10-11 DIAGNOSIS — M47.812 OSTEOARTHRITIS OF CERVICAL SPINE, UNSPECIFIED SPINAL OSTEOARTHRITIS COMPLICATION STATUS: ICD-10-CM

## 2017-10-11 DIAGNOSIS — G89.29 CHRONIC BILATERAL LOW BACK PAIN WITHOUT SCIATICA: ICD-10-CM

## 2017-10-11 DIAGNOSIS — M46.1 BILATERAL SACROILIITIS (HCC): ICD-10-CM

## 2017-10-11 DIAGNOSIS — M51.37 DEGENERATION OF LUMBAR OR LUMBOSACRAL INTERVERTEBRAL DISC: ICD-10-CM

## 2017-10-11 DIAGNOSIS — M54.50 CHRONIC BILATERAL LOW BACK PAIN WITHOUT SCIATICA: ICD-10-CM

## 2017-10-11 DIAGNOSIS — M48.061 SPINAL STENOSIS OF LUMBAR REGION, UNSPECIFIED WHETHER NEUROGENIC CLAUDICATION PRESENT: ICD-10-CM

## 2017-10-11 DIAGNOSIS — Z98.890 HISTORY OF LUMBOSACRAL SPINE SURGERY: ICD-10-CM

## 2017-10-11 DIAGNOSIS — M53.3 SACROILIAC JOINT PAIN: ICD-10-CM

## 2017-10-11 DIAGNOSIS — M54.2 CHRONIC NECK PAIN: ICD-10-CM

## 2017-10-11 RX ORDER — HYDROCODONE BITARTRATE AND ACETAMINOPHEN 10; 325 MG/1; MG/1
1 TABLET ORAL
Qty: 120 TAB | Refills: 0 | Status: SHIPPED | OUTPATIENT
Start: 2017-10-13 | End: 2018-01-17 | Stop reason: SDUPTHER

## 2017-10-11 RX ORDER — HYDROCODONE BITARTRATE AND ACETAMINOPHEN 10; 325 MG/1; MG/1
1 TABLET ORAL
Qty: 120 TAB | Refills: 0 | Status: SHIPPED | OUTPATIENT
Start: 2017-12-11 | End: 2018-01-17 | Stop reason: SDUPTHER

## 2017-10-11 RX ORDER — MORPHINE SULFATE 30 MG/1
30 TABLET, FILM COATED, EXTENDED RELEASE ORAL EVERY 24 HOURS
Qty: 30 TAB | Refills: 0 | Status: SHIPPED | OUTPATIENT
Start: 2017-11-12 | End: 2018-01-17 | Stop reason: SDUPTHER

## 2017-10-11 RX ORDER — HYDROCODONE BITARTRATE AND ACETAMINOPHEN 10; 325 MG/1; MG/1
1 TABLET ORAL
Qty: 120 TAB | Refills: 0 | Status: SHIPPED | OUTPATIENT
Start: 2017-11-12 | End: 2018-01-17 | Stop reason: SDUPTHER

## 2017-10-11 RX ORDER — MORPHINE SULFATE 30 MG/1
30 TABLET, FILM COATED, EXTENDED RELEASE ORAL EVERY 24 HOURS
Qty: 30 TAB | Refills: 0 | Status: SHIPPED | OUTPATIENT
Start: 2017-12-11 | End: 2018-01-17 | Stop reason: SDUPTHER

## 2017-10-11 RX ORDER — MORPHINE SULFATE 30 MG/1
30 TABLET, FILM COATED, EXTENDED RELEASE ORAL EVERY 24 HOURS
Qty: 30 TAB | Refills: 0 | Status: SHIPPED | OUTPATIENT
Start: 2017-10-13 | End: 2018-01-17 | Stop reason: SDUPTHER

## 2017-10-11 NOTE — PATIENT INSTRUCTIONS
1. Continue current plan with no evidence of addiction or diversion. Stable on current medication without adverse events. 2. Refill hydrocodone 10/325 mg up to 4 times daily as needed for pain. 3. Refill  morphine ER 30 mg  once daily. 4. Naloxone 4 mg nasal spray for opioid induced respiratory depression emergency only. 5. Consider repeat left piriformis Injection with Dr. Timmy Haley if needed   6. Continue Home exercise program.   7. Discussed risks of addiction, dependency, and opioid induced hyperalgesia.    8. RTC in 3 months

## 2017-10-11 NOTE — PROGRESS NOTES
Nursing Notes    Patient presents to the office today in follow-up. Patient rates her pain at 3/10 on the numerical pain scale. Reviewed medications with counts as follows:    Rx Date filled Qty Dispensed Pill Count Last Dose Short   Morphine sulf er 30 mg 09/14/17 30 5 yesterday no   norco 09/14/17 120 15 yesterday no                                   POC UDS was not performed in office today    Any new labs or imaging since last appointment? NO    Have you been to an emergency room (ER) or urgent care clinic since your last visit? YES, Aicha george            Have you been hospitalized since your last visit? NO     If yes, where, when, and reason for visit? Have you seen or consulted any other health care providers outside of the [address] [place] [address]  since your last visit? NO     If yes, where, when, and reason for visit? HM deferred to pcp.

## 2017-10-11 NOTE — PROGRESS NOTES
HISTORY OF PRESENT ILLNESS  Abril Blanc is a 62 y.o. female    HPI: Ms. Mela Phalen  returns today for f/u of chronic low back pain, Neck pain, and polyarticular pain. H/o 2 lumbar surgery and 1 cervical fusion. She reports good improvement from her surgeries. She completed PT with some benefit. She did try prior epidurals prior to her surgery with no improvement, none since. Recent surgery for lumbar hernia 3/3/16. Left piriformis injection by Dr. Karlene Yeboah on 5/24/2017 with good improvement    Ms. Dodd continues unchanged since last visit. She is doing very well with her current treatment plan which has been offering significant pain control. She is happy with her current progress. Her and her  have recently purchased a [place] and Citizen of the [place]. She is planning to go there next week. We will continue with her current treatment plan with no changes today. I will have her follow-up in 3 months for further evaluation and recommendation. Medication management consists of Morphine ER 30 mg QD and hydrocodone 10/325 QID PRN. Valium, zolpidem, and Trazadon by her PCP. Medications are helping with pain control and quality of life. Her pain is 3-4/10 with medication and 6-7/10 without. Pt describes pain as aching and burning. Aggravating factors include standing and  walking. Relieved with rest, medication, lying down, and avoiding painful activities. Current treatment is helping to improve general activity, mood, walking, sleep, enjoyment of life. Pt does report constipation but under better control with MiraLAX and conservative treatment. She  is otherwise doing well with no other complaints today. She denies any adverse events including nausea, vomiting, dizziness, constipation, hallucinations, or seizures. Because the patient's current regimen places him/her at increased risk for possible overdose, a prescription for naloxone nasal spray has been provided.   The patient understands that this medication is only to be used in the setting of a possible overdose and that inadvertent use of this medication could precipitate overt withdrawal.    Attended education class 2016      PRIOR IMAGIN. Lumbar CT 16: 1. L3-L4 intervertebral and posterior spinous process fusion. No fracture lumbar spine. Asymmetric thickening of the left piriformis muscle, may be post op or trauma. Allergies   Allergen Reactions    Iodinated Contrast- Oral And Iv Dye Anaphylaxis    Compazine [Prochlorperazine Edisylate] Angioedema     Dystonic tongue    Levaquin [Levofloxacin] Hives    Prednisone Anxiety       Past Surgical History:   Procedure Laterality Date    HX APPENDECTOMY      HX CERVICAL DISKECTOMY      HX  SECTION  1987, 93, 95    HX CHOLECYSTECTOMY      HX HYSTERECTOMY      total    HX LUMBAR DISKECTOMY      HX LYSIS OF ADHESIONS      HX ORTHOPAEDIC      back surgery with hardware    HX OTHER SURGICAL  2006    Bladder suspension-          Review of Systems   Constitutional: Negative. Negative for chills and fever. HENT: Negative for congestion and sore throat. Eyes: Negative for blurred vision and double vision. Respiratory: Negative for cough, shortness of breath and wheezing. Cardiovascular: Negative for chest pain and palpitations. Gastrointestinal: Negative for constipation, heartburn, nausea and vomiting. Genitourinary: Negative. Musculoskeletal: Positive for back pain, joint pain, myalgias and neck pain. Skin: Negative for itching and rash. Neurological: Negative for dizziness, seizures, loss of consciousness and headaches. Psychiatric/Behavioral: Negative. Negative for depression, hallucinations and suicidal ideas. The patient is not nervous/anxious. Physical Exam   Constitutional: She is oriented to person, place, and time and well-developed, well-nourished, and in no distress. No distress.    HENT:   Head: Normocephalic and atraumatic. Eyes: EOM are normal.   Neck: Normal range of motion. Pulmonary/Chest: Effort normal.   Musculoskeletal: Normal range of motion. Tender palpation in left piriformis aspect with mild spasm noted. Neurological: She is alert and oriented to person, place, and time. She has normal reflexes. Skin: Skin is warm and dry. No rash noted. She is not diaphoretic. No erythema. Psychiatric: Mood, memory, affect and judgment normal.   Nursing note and vitals reviewed. ASSESSMENT:    1. Chronic bilateral low back pain without sciatica    2. Degeneration of lumbar or lumbosacral intervertebral disc    3. Chronic neck pain    4. Osteoarthritis of cervical spine, unspecified spinal osteoarthritis complication status    5. Sacroiliac joint pain    6. Spinal stenosis of lumbar region, unspecified whether neurogenic claudication present    7. History of lumbosacral spine surgery    8. Bilateral sacroiliitis [facility] Prescription Monitoring Program was reviewed which does not demonstrate aberrancies and/or inconsistencies with regard to the historical prescribing of controlled medications to this patient by other providers. NOTE: Disclosed prescriptions for postsurgical pain 3/3, 3/10, 3/18, and 4/2. Cough syrup with hydrocodone 6/14, and 6/23. She reports that she did receive temporary rx for Valium by her PCP filled 11/11. PLAN / Pt Instructions:  1. Continue current plan with no evidence of addiction or diversion. Stable on current medication without adverse events. 2. Refill hydrocodone 10/325 mg up to 4 times daily as needed for pain. 3. Refill  morphine ER 30 mg  once daily. 4. Naloxone 4 mg nasal spray for opioid induced respiratory depression emergency only. 5. Consider repeat left piriformis Injection with Dr. Kendal Vasques if needed   6. Continue Home exercise program.   7. Discussed risks of addiction, dependency, and opioid induced hyperalgesia.    8. RTC in 3 months     Medications Ordered Today   Medications    morphine CR (MS CONTIN) 30 mg CR tablet     Sig: Take 1 Tab by mouth every twenty-four (24) hours for 30 days. Max Daily Amount: 30 mg. for chronic, severe, refractory pain  Indications: Chronic Pain, NEUROPATHIC PAIN, Severe Pain     Dispense:  30 Tab     Refill:  0    morphine CR (MS CONTIN) 30 mg CR tablet     Sig: Take 1 Tab by mouth every twenty-four (24) hours for 30 days. Max Daily Amount: 30 mg. for chronic, severe, refractory pain  Indications: Chronic Pain, NEUROPATHIC PAIN, Severe Pain     Dispense:  30 Tab     Refill:  0    morphine CR (MS CONTIN) 30 mg CR tablet     Sig: Take 1 Tab by mouth every twenty-four (24) hours for 30 days. Max Daily Amount: 30 mg. for chronic, severe, refractory pain  Indications: Chronic Pain, NEUROPATHIC PAIN, Severe Pain     Dispense:  30 Tab     Refill:  0    HYDROcodone-acetaminophen (NORCO)  mg tablet     Sig: Take 1 Tab by mouth four (4) times daily as needed for Pain for up to 30 days. Max Daily Amount: 4 Tabs. Indications: Pain     Dispense:  120 Tab     Refill:  0    HYDROcodone-acetaminophen (NORCO)  mg tablet     Sig: Take 1 Tab by mouth four (4) times daily as needed for Pain for up to 30 days. Max Daily Amount: 4 Tabs. Indications: Pain     Dispense:  120 Tab     Refill:  0    HYDROcodone-acetaminophen (NORCO)  mg tablet     Sig: Take 1 Tab by mouth four (4) times daily as needed for Pain for up to 30 days. Max Daily Amount: 4 Tabs. Indications: Pain     Dispense:  120 Tab     Refill:  0       Pain medications prescribed with the objective of pain relief and improved physical and psychosocial function in this patient. Spent 25 minutes with patient today reviewing the treatment plan, goals of treatment plan, and limitations of the treatment plan, to include the potential for side effects from medications and procedures.         Merton Kanner, [place] 10/11/2017      Note: Please excuse any typographical errors. Voice recognition software was used for this note and may cause mistakes.

## 2017-10-11 NOTE — MR AVS SNAPSHOT
Visit Information Date & Time Provider Department Dept. Phone Encounter #  
 10/11/2017  9:00 AM Jorja Crigler, [facility] [phone] Follow-up Instructions Return in about 3 months (around 1/11/2018). Upcoming Health Maintenance Date Due Hepatitis C Screening 1960 Pneumococcal 19-64 Medium Risk (1 of 1 - PPSV23) 4/23/1979 DTaP/Tdap/Td series (1 - Tdap) 4/23/1981 PAP AKA CERVICAL CYTOLOGY 4/23/1981 BREAST CANCER SCRN MAMMOGRAM 4/23/2010 FOBT Q 1 YEAR AGE 50-75 4/23/2010 INFLUENZA AGE 9 TO ADULT 8/1/2017 Allergies as of 10/11/2017  Review Complete On: 10/11/2017 By: Jorja Crigler, PA Severity Noted Reaction Type Reactions Iodinated Contrast- Oral And Iv Dye High 12/19/2012   Side Effect Anaphylaxis Compazine [Prochlorperazine Edisylate]  12/19/2012   Side Effect Angioedema Dystonic tongue Levaquin [Levofloxacin]  12/19/2012    Hives Prednisone  12/19/2012    Anxiety Current Immunizations  Never Reviewed Name Date Influenza Vaccine 10/12/2014 Not reviewed this visit You Were Diagnosed With   
  
 Codes Comments Chronic bilateral low back pain without sciatica     ICD-10-CM: M54.5, G89.29 ICD-9-CM: 724.2, 338.29 Degeneration of lumbar or lumbosacral intervertebral disc     ICD-10-CM: M51.37 
ICD-9-CM: 722.52 Chronic neck pain     ICD-10-CM: M54.2, G89.29 ICD-9-CM: 723.1, 338.29 Osteoarthritis of cervical spine, unspecified spinal osteoarthritis complication status     MNM-09-VH: H94.536 ICD-9-CM: 721.0 Sacroiliac joint pain     ICD-10-CM: M53.3 ICD-9-CM: 724.6 Spinal stenosis of lumbar region, unspecified whether neurogenic claudication present     ICD-10-CM: M48.061 
ICD-9-CM: 724.02 History of lumbosacral spine surgery     ICD-10-CM: Z98.890 ICD-9-CM: V15.29 Bilateral sacroiliitis (HCC)     ICD-10-CM: M46.1 ICD-9-CM: 720.2 Vitals BP Pulse Temp Height(growth percentile) Weight(growth percentile) BMI  
 131/77 79 96.9 °F (36.1 °C) 5' 6\" (1.676 m) 172 lb (78 kg) 27.76 kg/m2 OB Status Smoking Status Hysterectomy Current Every Day Smoker Vitals History BMI and BSA Data Body Mass Index Body Surface Area  
 27.76 kg/m 2 1.91 m 2 Preferred Pharmacy Pharmacy Name Phone [address], [address] [facility] [phone] Your Updated Medication List  
  
   
This list is accurate as of: 10/11/17 10:00 AM.  Always use your most recent med list.  
  
  
  
  
 AMBIEN 10 mg tablet Generic drug:  zolpidem Take  by mouth nightly as needed for Sleep.  
  
 gabapentin 300 mg capsule Commonly known as:  NEURONTIN Take 300 mg by mouth three (3) times daily. * HYDROcodone-acetaminophen  mg tablet Commonly known as:  Barnet Cuff Take 1 Tab by mouth four (4) times daily as needed for Pain for up to 30 days. Max Daily Amount: 4 Tabs. Indications: Pain Start taking on:  10/13/2017  
  
 * HYDROcodone-acetaminophen  mg tablet Commonly known as:  Barnet Cuff Take 1 Tab by mouth four (4) times daily as needed for Pain for up to 30 days. Max Daily Amount: 4 Tabs. Indications: Pain Start taking on:  11/12/2017  
  
 * HYDROcodone-acetaminophen  mg tablet Commonly known as:  Barnet Cuff Take 1 Tab by mouth four (4) times daily as needed for Pain for up to 30 days. Max Daily Amount: 4 Tabs. Indications: Pain Start taking on:  12/11/2017 * morphine CR 30 mg CR tablet Commonly known as:  MS CONTIN Take 1 Tab by mouth every twenty-four (24) hours for 30 days. Max Daily Amount: 30 mg. for chronic, severe, refractory pain  Indications: Chronic Pain, NEUROPATHIC PAIN, Severe Pain Start taking on:  10/13/2017 * morphine CR 30 mg CR tablet Commonly known as:  MS CONTIN  
 Take 1 Tab by mouth every twenty-four (24) hours for 30 days. Max Daily Amount: 30 mg. for chronic, severe, refractory pain  Indications: Chronic Pain, NEUROPATHIC PAIN, Severe Pain Start taking on:  11/12/2017 * morphine CR 30 mg CR tablet Commonly known as:  MS CONTIN Take 1 Tab by mouth every twenty-four (24) hours for 30 days. Max Daily Amount: 30 mg. for chronic, severe, refractory pain  Indications: Chronic Pain, NEUROPATHIC PAIN, Severe Pain Start taking on:  12/11/2017  
  
 naloxone 4 mg/actuation nasal spray Commonly known as:  NARCAN  
4 mg by Nasal route as needed. For emergency use only  Indications: OPIATE-INDUCED RESPIRATORY DEPRESSION  
  
 traZODone 100 mg tablet Commonly known as:  Uche Bryon Take 2 Tabs by mouth nightly. For chronic insomnia * Notice: This list has 6 medication(s) that are the same as other medications prescribed for you. Read the directions carefully, and ask your doctor or other care provider to review them with you. Prescriptions Printed Refills  
 morphine CR (MS CONTIN) 30 mg CR tablet 0 Starting on: 10/13/2017 Sig: Take 1 Tab by mouth every twenty-four (24) hours for 30 days. Max Daily Amount: 30 mg. for chronic, severe, refractory pain  Indications: Chronic Pain, NEUROPATHIC PAIN, Severe Pain Class: Print Route: Oral  
 morphine CR (MS CONTIN) 30 mg CR tablet 0 Starting on: 11/12/2017 Sig: Take 1 Tab by mouth every twenty-four (24) hours for 30 days. Max Daily Amount: 30 mg. for chronic, severe, refractory pain  Indications: Chronic Pain, NEUROPATHIC PAIN, Severe Pain Class: Print Route: Oral  
 morphine CR (MS CONTIN) 30 mg CR tablet 0 Starting on: 12/11/2017 Sig: Take 1 Tab by mouth every twenty-four (24) hours for 30 days. Max Daily Amount: 30 mg. for chronic, severe, refractory pain  Indications: Chronic Pain, NEUROPATHIC PAIN, Severe Pain Class: Print  Route: Oral  
 HYDROcodone-acetaminophen (NORCO)  mg tablet 0 Starting on: 10/13/2017 Sig: Take 1 Tab by mouth four (4) times daily as needed for Pain for up to 30 days. Max Daily Amount: 4 Tabs. Indications: Pain Class: Print Route: Oral  
 HYDROcodone-acetaminophen (NORCO)  mg tablet 0 Starting on: 11/12/2017 Sig: Take 1 Tab by mouth four (4) times daily as needed for Pain for up to 30 days. Max Daily Amount: 4 Tabs. Indications: Pain Class: Print Route: Oral  
 HYDROcodone-acetaminophen (NORCO)  mg tablet 0 Starting on: 12/11/2017 Sig: Take 1 Tab by mouth four (4) times daily as needed for Pain for up to 30 days. Max Daily Amount: 4 Tabs. Indications: Pain Class: Print Route: Oral  
  
Follow-up Instructions Return in about 3 months (around 1/11/2018). Patient Instructions 1. Continue current plan with no evidence of addiction or diversion. Stable on current medication without adverse events. 2. Refill hydrocodone 10/325 mg up to 4 times daily as needed for pain. 3. Refill  morphine ER 30 mg  once daily. 4. Naloxone 4 mg nasal spray for opioid induced respiratory depression emergency only. 5. Consider repeat left piriformis Injection with Dr. Cristofer Retana if needed 6. Continue Home exercise program.  
7. Discussed risks of addiction, dependency, and opioid induced hyperalgesia. 8. RTC in 3 months Introducing [facility]! Dear Karlene Rosenthal: Thank you for requesting a [employer] account. Our records indicate that you already have an active [employer] account. You can access your account anytime at [email or web address] Did you know that you can access your hospital and ER discharge instructions at any time in [employer]? You can also review all of your test results from your hospital stay or ER visit. Additional Information If you have questions, please visit the Frequently Asked Questions section of the [employer] website at [email or web address] Remember, [employer] is NOT to be used for urgent needs. For medical emergencies, dial 911. Now available from your iPhone and Android! Please provide this summary of care documentation to your next provider. Your primary care clinician is listed as Charley Files. If you have any questions after today's visit, please call [phone].

## 2018-01-17 ENCOUNTER — OFFICE VISIT (OUTPATIENT)
Dept: PAIN MANAGEMENT | Age: 58
End: 2018-01-17

## 2018-01-17 VITALS
RESPIRATION RATE: 14 BRPM | HEIGHT: 66 IN | TEMPERATURE: 97 F | HEART RATE: 80 BPM | SYSTOLIC BLOOD PRESSURE: 132 MMHG | WEIGHT: 172 LBS | BODY MASS INDEX: 27.64 KG/M2 | DIASTOLIC BLOOD PRESSURE: 84 MMHG

## 2018-01-17 DIAGNOSIS — M53.3 SACROILIAC JOINT PAIN: ICD-10-CM

## 2018-01-17 DIAGNOSIS — M48.061 SPINAL STENOSIS OF LUMBAR REGION, UNSPECIFIED WHETHER NEUROGENIC CLAUDICATION PRESENT: ICD-10-CM

## 2018-01-17 DIAGNOSIS — M54.2 CHRONIC NECK PAIN: ICD-10-CM

## 2018-01-17 DIAGNOSIS — M54.50 CHRONIC BILATERAL LOW BACK PAIN WITHOUT SCIATICA: ICD-10-CM

## 2018-01-17 DIAGNOSIS — G89.29 CHRONIC BILATERAL LOW BACK PAIN WITHOUT SCIATICA: ICD-10-CM

## 2018-01-17 DIAGNOSIS — M47.812 OSTEOARTHRITIS OF CERVICAL SPINE, UNSPECIFIED SPINAL OSTEOARTHRITIS COMPLICATION STATUS: ICD-10-CM

## 2018-01-17 DIAGNOSIS — G89.29 CHRONIC NECK PAIN: ICD-10-CM

## 2018-01-17 DIAGNOSIS — M96.1 POSTLAMINECTOMY SYNDROME, LUMBAR REGION: ICD-10-CM

## 2018-01-17 RX ORDER — HYDROCODONE BITARTRATE AND ACETAMINOPHEN 10; 325 MG/1; MG/1
1 TABLET ORAL
Qty: 120 TAB | Refills: 0 | Status: SHIPPED | OUTPATIENT
Start: 2018-03-17 | End: 2018-04-09 | Stop reason: SDUPTHER

## 2018-01-17 RX ORDER — MORPHINE SULFATE 30 MG/1
30 TABLET, FILM COATED, EXTENDED RELEASE ORAL EVERY 24 HOURS
Qty: 30 TAB | Refills: 0 | Status: SHIPPED | OUTPATIENT
Start: 2018-02-16 | End: 2018-04-09 | Stop reason: SDUPTHER

## 2018-01-17 RX ORDER — MORPHINE SULFATE 30 MG/1
30 TABLET, FILM COATED, EXTENDED RELEASE ORAL EVERY 24 HOURS
Qty: 30 TAB | Refills: 0 | Status: SHIPPED | OUTPATIENT
Start: 2018-03-17 | End: 2018-04-09 | Stop reason: SDUPTHER

## 2018-01-17 RX ORDER — MORPHINE SULFATE 30 MG/1
30 TABLET, FILM COATED, EXTENDED RELEASE ORAL DAILY
COMMUNITY
End: 2020-06-17 | Stop reason: ALTCHOICE

## 2018-01-17 RX ORDER — HYDROCODONE BITARTRATE AND ACETAMINOPHEN 10; 325 MG/1; MG/1
1 TABLET ORAL
Qty: 120 TAB | Refills: 0 | Status: SHIPPED | OUTPATIENT
Start: 2018-01-17 | End: 2018-04-09 | Stop reason: SDUPTHER

## 2018-01-17 RX ORDER — MORPHINE SULFATE 30 MG/1
30 TABLET, FILM COATED, EXTENDED RELEASE ORAL EVERY 24 HOURS
Qty: 30 TAB | Refills: 0 | Status: SHIPPED | OUTPATIENT
Start: 2018-01-17 | End: 2018-04-09 | Stop reason: SDUPTHER

## 2018-01-17 RX ORDER — HYDROCODONE BITARTRATE AND ACETAMINOPHEN 10; 325 MG/1; MG/1
1 TABLET ORAL
Qty: 120 TAB | Refills: 0 | Status: SHIPPED | OUTPATIENT
Start: 2018-02-16 | End: 2018-04-09 | Stop reason: SDUPTHER

## 2018-01-17 RX ORDER — DIAZEPAM 10 MG/1
10 TABLET ORAL
COMMUNITY
End: 2021-12-08

## 2018-01-17 RX ORDER — HYDROCODONE BITARTRATE AND ACETAMINOPHEN 10; 325 MG/1; MG/1
1 TABLET ORAL
Status: ON HOLD | COMMUNITY
End: 2020-09-17

## 2018-01-17 NOTE — PROGRESS NOTES
HISTORY OF PRESENT ILLNESS  Mary Jerome is a 62 y.o. female    HPI: Ms. Cecily Guzman  returns today for f/u of chronic low back pain, Neck pain, and polyarticular pain. H/o 2 lumbar surgery and 1 cervical fusion. She reports good improvement from her surgeries. She completed PT with some benefit. She did try prior epidurals prior to her surgery with no improvement, none since. Recent surgery for lumbar hernia 3/3/16. Left piriformis injection by Dr. Isai Hairston on 5/24/2017 with good improvement    Ms. Dodd continues unchanged since last visit. She continues to do well with her current treatment plan which has been offering significant pain control. She reports no new complaints today. We will continue with her current treatment plan with no changes today. I will have her follow-up in 3 months for further evaluation and recommendation    Medication management consists of Morphine ER 30 mg QD and hydrocodone 10/325 QID PRN. Valium, zolpidem, and Trazadon by her PCP. Medications are helping with pain control and quality of life. Her pain is 3-4/10 with medication and 6-7/10 without. Pt describes pain as aching and burning. Aggravating factors include standing and  walking. Relieved with rest, medication, lying down, and avoiding painful activities. Current treatment is helping to improve general activity, mood, walking, sleep, enjoyment of life. Pt does report constipation but under better control with MiraLAX and conservative treatment. She  is otherwise doing well with no other complaints today. She denies any adverse events including nausea, vomiting, dizziness, constipation, hallucinations, or seizures. Because the patient's current regimen places him/her at increased risk for possible overdose, a prescription for naloxone nasal spray has been provided.   The patient understands that this medication is only to be used in the setting of a possible overdose and that inadvertent use of this medication could precipitate overt withdrawal.    Attended education class 2016      PRIOR IMAGIN. Lumbar CT 16: 1. L3-L4 intervertebral and posterior spinous process fusion. No fracture lumbar spine. Asymmetric thickening of the left piriformis muscle, may be post op or trauma. Allergies   Allergen Reactions    Iodinated Contrast- Oral And Iv Dye Anaphylaxis    Compazine [Prochlorperazine Edisylate] Angioedema     Dystonic tongue    Levaquin [Levofloxacin] Hives    Prednisone Anxiety       Past Surgical History:   Procedure Laterality Date    HX APPENDECTOMY      HX CERVICAL DISKECTOMY      HX  SECTION  1987, ,     HX CHOLECYSTECTOMY      HX HYSTERECTOMY      total    HX LUMBAR DISKECTOMY      HX LYSIS OF ADHESIONS      HX ORTHOPAEDIC      back surgery with hardware    HX OTHER SURGICAL  2006    Bladder suspension-          Review of Systems   Constitutional: Negative. Negative for chills and fever. HENT: Negative for congestion and sore throat. Eyes: Negative for blurred vision and double vision. Respiratory: Negative for cough, shortness of breath and wheezing. Cardiovascular: Negative for chest pain and palpitations. Gastrointestinal: Negative for constipation, heartburn, nausea and vomiting. Genitourinary: Negative. Musculoskeletal: Positive for back pain, joint pain, myalgias and neck pain. Skin: Negative for itching and rash. Neurological: Negative for dizziness, seizures, loss of consciousness and headaches. Psychiatric/Behavioral: Negative. Negative for depression, hallucinations and suicidal ideas. The patient is not nervous/anxious. Physical Exam   Constitutional: She is oriented to person, place, and time and well-developed, well-nourished, and in no distress. No distress. HENT:   Head: Normocephalic and atraumatic. Eyes: EOM are normal.   Neck: Normal range of motion.    Pulmonary/Chest: Effort normal.   Musculoskeletal: Normal range of motion. Tender palpation in left piriformis aspect with mild spasm noted. Neurological: She is alert and oriented to person, place, and time. She has normal reflexes. Skin: Skin is warm and dry. No rash noted. She is not diaphoretic. No erythema. Psychiatric: Mood, memory, affect and judgment normal.   Nursing note and vitals reviewed. ASSESSMENT:    1. Postlaminectomy syndrome, lumbar region    2. Chronic bilateral low back pain without sciatica    3. Chronic neck pain    4. Osteoarthritis of cervical spine, unspecified spinal osteoarthritis complication status    5. Sacroiliac joint pain    6. Spinal stenosis of lumbar region, unspecified whether neurogenic claudication present         [facility] [employer] Program was reviewed which does not demonstrate aberrancies and/or inconsistencies with regard to the historical prescribing of controlled medications to this patient by other providers. NOTE: Disclosed prescriptions for postsurgical pain 3/3, 3/10, 3/18, and 4/2. Cough syrup with hydrocodone 6/14, and 6/23. She reports that she did receive temporary rx for Valium by her PCP filled 11/11. PLAN / Pt Instructions:  1. Continue current plan with no evidence of addiction or diversion. Stable on current medication without adverse events. 2. Refill hydrocodone 10/325 mg up to 4 times daily as needed for pain. 3. Refill  morphine ER 30 mg  once daily. 4. Naloxone 4 mg nasal spray for opioid induced respiratory depression emergency only. 5. Consider repeat left piriformis Injection with Dr. Yfn Trevino if needed   6. Continue Home exercise program.   7. Discussed risks of addiction, dependency, and opioid induced hyperalgesia. 8. RTC in 3 months     Medications Ordered Today   Medications    morphine CR (MS CONTIN) 30 mg CR tablet     Sig: Take 1 Tab by mouth every twenty-four (24) hours for 30 days.  Max Daily Amount: 30 mg. for chronic, severe, refractory pain  Indications: Chronic Pain, NEUROPATHIC PAIN, Severe Pain     Dispense:  30 Tab     Refill:  0    morphine CR (MS CONTIN) 30 mg CR tablet     Sig: Take 1 Tab by mouth every twenty-four (24) hours for 30 days. Max Daily Amount: 30 mg. for chronic, severe, refractory pain  Indications: Chronic Pain, NEUROPATHIC PAIN, Severe Pain     Dispense:  30 Tab     Refill:  0    morphine CR (MS CONTIN) 30 mg CR tablet     Sig: Take 1 Tab by mouth every twenty-four (24) hours for 30 days. Max Daily Amount: 30 mg. for chronic, severe, refractory pain  Indications: Chronic Pain, NEUROPATHIC PAIN, Severe Pain     Dispense:  30 Tab     Refill:  0    HYDROcodone-acetaminophen (NORCO)  mg tablet     Sig: Take 1 Tab by mouth four (4) times daily as needed for Pain for up to 30 days. Max Daily Amount: 4 Tabs. Indications: Pain     Dispense:  120 Tab     Refill:  0    HYDROcodone-acetaminophen (NORCO)  mg tablet     Sig: Take 1 Tab by mouth four (4) times daily as needed for Pain for up to 30 days. Max Daily Amount: 4 Tabs. Indications: Pain     Dispense:  120 Tab     Refill:  0    HYDROcodone-acetaminophen (NORCO)  mg tablet     Sig: Take 1 Tab by mouth four (4) times daily as needed for Pain for up to 30 days. Max Daily Amount: 4 Tabs. Indications: Pain     Dispense:  120 Tab     Refill:  0       Pain medications prescribed with the objective of pain relief and improved physical and psychosocial function in this patient. Spent 25 minutes with patient today reviewing the treatment plan, goals of treatment plan, and limitations of the treatment plan, to include the potential for side effects from medications and procedures. [place], [place] 1/17/2018      Note: Please excuse any typographical errors. Voice recognition software was used for this note and may cause mistakes.

## 2018-01-17 NOTE — PROGRESS NOTES
Nursing Notes    Patient presents to the office today in follow-up. Patient rates her pain at 4/10 on the numerical pain scale. Reviewed medications with counts as follows:    Rx Date filled Qty Dispensed Pill Count Last Dose Short   Morphine sulfate ER 30 mg  12/11/17 30 1 today no   norco 10/325 mg  12/11/17 120 2 yesterday no                            POC UDS was not performed in office today    Any new labs or imaging since last appointment? NO    Have you been to an emergency room (ER) or urgent care clinic since your last visit? NO            Have you been hospitalized since your last visit? NO     If yes, where, when, and reason for visit? Have you seen or consulted any other health care providers outside of the [address] [place] [address]  since your last visit? NO     If yes, where, when, and reason for visit? HM deferred to pcp.

## 2018-01-17 NOTE — PATIENT INSTRUCTIONS
1. Continue current plan with no evidence of addiction or diversion. Stable on current medication without adverse events. 2. Refill hydrocodone 10/325 mg up to 4 times daily as needed for pain. 3. Refill  morphine ER 30 mg  once daily. 4. Naloxone 4 mg nasal spray for opioid induced respiratory depression emergency only. 5. Consider repeat left piriformis Injection with Dr. Papo Barrientos if needed   6. Continue Home exercise program.   7. Discussed risks of addiction, dependency, and opioid induced hyperalgesia. 8. RTC in 3 months            Learning About Benefits From Quitting Smoking  How does quitting smoking make you healthier? If you're thinking about quitting smoking, you may have a few reasons to be smoke-free. Your health may be one of them. · When you quit smoking, you lower your risks for cancer, lung disease, heart attack, stroke, blood vessel disease, and blindness from macular degeneration. · When you're smoke-free, you get sick less often, and you heal faster. You are less likely to get colds, flu, bronchitis, and pneumonia. · As a nonsmoker, you may find that your mood is better and you are less stressed. When and how will you feel healthier? Quitting has real health benefits that start from day 1 of being smoke-free. And the longer you stay smoke-free, the healthier you get and the better you feel. The first hours  · After just 20 minutes, your blood pressure and heart rate go down. That means there's less stress on your heart and blood vessels. · Within 12 hours, the level of carbon monoxide in your blood drops back to normal. That makes room for more oxygen. With more oxygen in your body, you may notice that you have more energy than when you smoked. After 2 weeks  · Your lungs start to work better. · Your risk of heart attack starts to drop. After 1 month  · When your lungs are clear, you cough less and breathe deeper, so it's easier to be active.   · Your sense of taste and smell return. That means you can enjoy food more than you have since you started smoking. Over the years  · After 1 year, your risk of heart disease is half what it would be if you kept smoking. · After 5 years, your risk of stroke starts to shrink. Within a few years after that, it's about the same as if you'd never smoked. · After 10 years, your risk of dying from lung cancer is cut by about half. And your risk for many other types of cancer is lower too. How would quitting help others in your life? When you quit smoking, you improve the health of everyone who now breathes in your smoke. · Their heart, lung, and cancer risks drop, much like yours. · They are sick less. For babies and small children, living smoke-free means they're less likely to have ear infections, pneumonia, and bronchitis. · If you're a woman who is or will be pregnant someday, quitting smoking means a healthier . · Children who are close to you are less likely to become adult smokers. Where can you learn more? Go to http://vijaya-akua.info/. Enter 052 [id number] [id number] 11 in the search box to learn more about \"Learning About Benefits From Quitting Smoking. \"  Current as of: 2017  Content Version: 11.4  © 1669-4521 [employer]. Care instructions adapted under license by [employer] (which disclaims liability or warranty for this information). If you have questions about a medical condition or this instruction, always ask your healthcare professional. [place] [address] any warranty or liability for your use of this information.

## 2018-01-17 NOTE — MR AVS SNAPSHOT
[address] [address] 
[phone] Patient: Alex Smith MRN: [id number] Visit Information Date & Time Provider Department Dept. Phone Encounter #  
 1/17/2018  8:40 AM Mana Son, [facility] [phone] [id number] Follow-up Instructions Return in about 3 months (around 4/17/2018). Upcoming Health Maintenance Date Due Hepatitis C Screening 1960 Pneumococcal 19-64 Medium Risk (1 of 1 - PPSV23) 4/23/1979 DTaP/Tdap/Td series (1 - Tdap) 4/23/1981 PAP AKA CERVICAL CYTOLOGY 4/23/1981 BREAST CANCER SCRN MAMMOGRAM 4/23/2010 FOBT Q 1 YEAR AGE 50-75 4/23/2010 Influenza Age 5 to Adult 8/1/2017 Allergies as of 1/17/2018  Review Complete On: 1/17/2018 By: Travis Jacobsen LPN Severity Noted Reaction Type Reactions Iodinated Contrast- Oral And Iv Dye High 12/19/2012   Side Effect Anaphylaxis Compazine [Prochlorperazine Edisylate]  12/19/2012   Side Effect Angioedema Dystonic tongue Levaquin [Levofloxacin]  12/19/2012    Hives Prednisone  12/19/2012    Anxiety Current Immunizations  Never Reviewed Name Date Influenza Vaccine 10/12/2014 Not reviewed this visit You Were Diagnosed With   
  
 Codes Comments Postlaminectomy syndrome, lumbar region     ICD-10-CM: M96.1 ICD-9-CM: 722.83 Chronic bilateral low back pain without sciatica     ICD-10-CM: M54.5, G89.29 ICD-9-CM: 724.2, 338.29 Chronic neck pain     ICD-10-CM: M54.2, G89.29 ICD-9-CM: 723.1, 338.29 Osteoarthritis of cervical spine, unspecified spinal osteoarthritis complication status     GZC-57-CLEO: S24.154 ICD-9-CM: 721.0 Sacroiliac joint pain     ICD-10-CM: M53.3 ICD-9-CM: 724.6 Spinal stenosis of lumbar region, unspecified whether neurogenic claudication present     ICD-10-CM: M48.061 
ICD-9-CM: 724.02 Vitals BP Pulse Temp Resp Height(growth percentile) Weight(growth percentile) 132/84 (BP 1 Location: Right arm, BP Patient Position: Sitting) 80 97 °F (36.1 °C) (Oral) 14 5' 6\" (1.676 m) 172 lb (78 kg) BMI OB Status Smoking Status 27.76 kg/m2 Hysterectomy Current Every Day Smoker Vitals History BMI and BSA Data Body Mass Index Body Surface Area  
 27.76 kg/m 2 1.91 m 2 Preferred Pharmacy Pharmacy Name Phone [address], [address] [place] [phone] Your Updated Medication List  
  
   
This list is accurate as of: 1/17/18  9:08 AM.  Always use your most recent med list.  
  
  
  
  
 AMBIEN 10 mg tablet Generic drug:  zolpidem Take  by mouth nightly as needed for Sleep.  
  
 gabapentin 300 mg capsule Commonly known as:  NEURONTIN Take 300 mg by mouth three (3) times daily. * morphine CR 30 mg CR tablet Commonly known as:  MS CONTIN Take 30 mg by mouth daily. * morphine CR 30 mg CR tablet Commonly known as:  MS CONTIN Take 1 Tab by mouth every twenty-four (24) hours for 30 days. Max Daily Amount: 30 mg. for chronic, severe, refractory pain  Indications: Chronic Pain, NEUROPATHIC PAIN, Severe Pain * morphine CR 30 mg CR tablet Commonly known as:  MS CONTIN Take 1 Tab by mouth every twenty-four (24) hours for 30 days. Max Daily Amount: 30 mg. for chronic, severe, refractory pain  Indications: Chronic Pain, NEUROPATHIC PAIN, Severe Pain Start taking on:  2/16/2018 * morphine CR 30 mg CR tablet Commonly known as:  MS CONTIN Take 1 Tab by mouth every twenty-four (24) hours for 30 days. Max Daily Amount: 30 mg. for chronic, severe, refractory pain  Indications: Chronic Pain, NEUROPATHIC PAIN, Severe Pain Start taking on:  3/17/2018  
  
 naloxone 4 mg/actuation nasal spray Commonly known as:  NARCAN  
4 mg by Nasal route as needed.  For emergency use only  Indications: OPIATE-INDUCED RESPIRATORY DEPRESSION  
  
 * NORCO  mg tablet Generic drug:  HYDROcodone-acetaminophen Take 1 Tab by mouth every six (6) hours as needed for Pain. * HYDROcodone-acetaminophen  mg tablet Commonly known as:  Mabel Chain Take 1 Tab by mouth four (4) times daily as needed for Pain for up to 30 days. Max Daily Amount: 4 Tabs. Indications: Pain  
  
 * HYDROcodone-acetaminophen  mg tablet Commonly known as:  Mabel Chain Take 1 Tab by mouth four (4) times daily as needed for Pain for up to 30 days. Max Daily Amount: 4 Tabs. Indications: Pain Start taking on:  2/16/2018  
  
 * HYDROcodone-acetaminophen  mg tablet Commonly known as:  Mabel Chain Take 1 Tab by mouth four (4) times daily as needed for Pain for up to 30 days. Max Daily Amount: 4 Tabs. Indications: Pain Start taking on:  3/17/2018  
  
 traZODone 100 mg tablet Commonly known as:  Tj Jj Take 2 Tabs by mouth nightly. For chronic insomnia VALIUM 10 mg tablet Generic drug:  diazePAM  
Take 10 mg by mouth every twelve (12) hours as needed for Anxiety. * Notice: This list has 8 medication(s) that are the same as other medications prescribed for you. Read the directions carefully, and ask your doctor or other care provider to review them with you. Prescriptions Printed Refills  
 morphine CR (MS CONTIN) 30 mg CR tablet 0 Sig: Take 1 Tab by mouth every twenty-four (24) hours for 30 days. Max Daily Amount: 30 mg. for chronic, severe, refractory pain  Indications: Chronic Pain, NEUROPATHIC PAIN, Severe Pain Class: Print Route: Oral  
 morphine CR (MS CONTIN) 30 mg CR tablet 0 Starting on: 2/16/2018 Sig: Take 1 Tab by mouth every twenty-four (24) hours for 30 days. Max Daily Amount: 30 mg. for chronic, severe, refractory pain  Indications: Chronic Pain, NEUROPATHIC PAIN, Severe Pain Class: Print  Route: Oral  
 morphine CR (MS CONTIN) 30 mg CR tablet 0  
 Starting on: 3/17/2018 Sig: Take 1 Tab by mouth every twenty-four (24) hours for 30 days. Max Daily Amount: 30 mg. for chronic, severe, refractory pain  Indications: Chronic Pain, NEUROPATHIC PAIN, Severe Pain Class: Print Route: Oral  
 HYDROcodone-acetaminophen (NORCO)  mg tablet 0 Sig: Take 1 Tab by mouth four (4) times daily as needed for Pain for up to 30 days. Max Daily Amount: 4 Tabs. Indications: Pain Class: Print Route: Oral  
 HYDROcodone-acetaminophen (NORCO)  mg tablet 0 Starting on: 2/16/2018 Sig: Take 1 Tab by mouth four (4) times daily as needed for Pain for up to 30 days. Max Daily Amount: 4 Tabs. Indications: Pain Class: Print Route: Oral  
 HYDROcodone-acetaminophen (NORCO)  mg tablet 0 Starting on: 3/17/2018 Sig: Take 1 Tab by mouth four (4) times daily as needed for Pain for up to 30 days. Max Daily Amount: 4 Tabs. Indications: Pain Class: Print Route: Oral  
  
Follow-up Instructions Return in about 3 months (around 4/17/2018). Patient Instructions 1. Continue current plan with no evidence of addiction or diversion. Stable on current medication without adverse events. 2. Refill hydrocodone 10/325 mg up to 4 times daily as needed for pain. 3. Refill  morphine ER 30 mg  once daily. 4. Naloxone 4 mg nasal spray for opioid induced respiratory depression emergency only. 5. Consider repeat left piriformis Injection with Dr. Max Blair if needed 6. Continue Home exercise program.  
7. Discussed risks of addiction, dependency, and opioid induced hyperalgesia. 8. RTC in 3 months Learning About Benefits From Quitting Smoking How does quitting smoking make you healthier? If you're thinking about quitting smoking, you may have a few reasons to be smoke-free. Your health may be one of them.  
· When you quit smoking, you lower your risks for cancer, lung disease, heart attack, stroke, blood vessel disease, and blindness from macular degeneration. · When you're smoke-free, you get sick less often, and you heal faster. You are less likely to get colds, flu, bronchitis, and pneumonia. · As a nonsmoker, you may find that your mood is better and you are less stressed. When and how will you feel healthier? Quitting has real health benefits that start from day 1 of being smoke-free. And the longer you stay smoke-free, the healthier you get and the better you feel. The first hours · After just 20 minutes, your blood pressure and heart rate go down. That means there's less stress on your heart and blood vessels. · Within 12 hours, the level of carbon monoxide in your blood drops back to normal. That makes room for more oxygen. With more oxygen in your body, you may notice that you have more energy than when you smoked. After 2 weeks · Your lungs start to work better. · Your risk of heart attack starts to drop. After 1 month · When your lungs are clear, you cough less and breathe deeper, so it's easier to be active. · Your sense of taste and smell return. That means you can enjoy food more than you have since you started smoking. Over the years · After 1 year, your risk of heart disease is half what it would be if you kept smoking. · After 5 years, your risk of stroke starts to shrink. Within a few years after that, it's about the same as if you'd never smoked. · After 10 years, your risk of dying from lung cancer is cut by about half. And your risk for many other types of cancer is lower too. How would quitting help others in your life? When you quit smoking, you improve the health of everyone who now breathes in your smoke. · Their heart, lung, and cancer risks drop, much like yours. · They are sick less. For babies and small children, living smoke-free means they're less likely to have ear infections, pneumonia, and bronchitis. · If you're a woman who is or will be pregnant someday, quitting smoking means a healthier . · Children who are close to you are less likely to become adult smokers. Where can you learn more? Go to http://vijaya-akua.info/. Enter [id number] [id number] in the search box to learn more about \"Learning About Benefits From Quitting Smoking. \" Current as of: 2017 Content Version: 11.4 © 5608-2048 [employer]. Care instructions adapted under license by [employer] (which disclaims liability or warranty for this information). If you have questions about a medical condition or this instruction, always ask your healthcare professional. [place] [address] any warranty or liability for your use of this information. Introducing [address]! Dear Leonard Cain: Thank you for requesting a [employer] account. Our records indicate that you already have an active [employer] account. You can access your account anytime at [email or web address] Did you know that you can access your hospital and ER discharge instructions at any time in [employer]? You can also review all of your test results from your hospital stay or ER visit. Additional Information If you have questions, please visit the Frequently Asked Questions section of the [employer] website at [email or web address] Remember, [employer] is NOT to be used for urgent needs. For medical emergencies, dial 911. Now available from your iPhone and Android! Please provide this summary of care documentation to your next provider. Your primary care clinician is listed as Mial Sandoval If you have any questions after today's visit, please call [phone].

## 2018-04-09 ENCOUNTER — OFFICE VISIT (OUTPATIENT)
Dept: PAIN MANAGEMENT | Age: 58
End: 2018-04-09

## 2018-04-09 VITALS
BODY MASS INDEX: 27.64 KG/M2 | WEIGHT: 172 LBS | HEART RATE: 84 BPM | SYSTOLIC BLOOD PRESSURE: 130 MMHG | TEMPERATURE: 97.1 F | DIASTOLIC BLOOD PRESSURE: 81 MMHG | RESPIRATION RATE: 16 BRPM | HEIGHT: 66 IN

## 2018-04-09 DIAGNOSIS — M46.1 BILATERAL SACROILIITIS (HCC): ICD-10-CM

## 2018-04-09 DIAGNOSIS — M53.3 SACROILIAC JOINT PAIN: ICD-10-CM

## 2018-04-09 DIAGNOSIS — M54.2 CHRONIC NECK PAIN: ICD-10-CM

## 2018-04-09 DIAGNOSIS — M54.50 CHRONIC BILATERAL LOW BACK PAIN WITHOUT SCIATICA: ICD-10-CM

## 2018-04-09 DIAGNOSIS — Z79.899 ENCOUNTER FOR LONG-TERM (CURRENT) USE OF HIGH-RISK MEDICATION: Primary | ICD-10-CM

## 2018-04-09 DIAGNOSIS — G89.29 CHRONIC NECK PAIN: ICD-10-CM

## 2018-04-09 DIAGNOSIS — M48.061 SPINAL STENOSIS OF LUMBAR REGION, UNSPECIFIED WHETHER NEUROGENIC CLAUDICATION PRESENT: ICD-10-CM

## 2018-04-09 DIAGNOSIS — M96.1 POSTLAMINECTOMY SYNDROME, LUMBAR REGION: ICD-10-CM

## 2018-04-09 DIAGNOSIS — G89.29 CHRONIC BILATERAL LOW BACK PAIN WITHOUT SCIATICA: ICD-10-CM

## 2018-04-09 RX ORDER — MORPHINE SULFATE 30 MG/1
30 TABLET, FILM COATED, EXTENDED RELEASE ORAL EVERY 24 HOURS
Qty: 30 TAB | Refills: 0 | Status: SHIPPED | OUTPATIENT
Start: 2018-06-15 | End: 2018-07-11 | Stop reason: SDUPTHER

## 2018-04-09 RX ORDER — MORPHINE SULFATE 30 MG/1
30 TABLET, FILM COATED, EXTENDED RELEASE ORAL EVERY 24 HOURS
Qty: 30 TAB | Refills: 0 | Status: SHIPPED | OUTPATIENT
Start: 2018-05-16 | End: 2018-07-11 | Stop reason: SDUPTHER

## 2018-04-09 RX ORDER — HYDROCODONE BITARTRATE AND ACETAMINOPHEN 10; 325 MG/1; MG/1
1 TABLET ORAL
Qty: 120 TAB | Refills: 0 | Status: SHIPPED | OUTPATIENT
Start: 2018-05-16 | End: 2018-07-11 | Stop reason: SDUPTHER

## 2018-04-09 RX ORDER — MORPHINE SULFATE 30 MG/1
30 TABLET, FILM COATED, EXTENDED RELEASE ORAL EVERY 24 HOURS
Qty: 30 TAB | Refills: 0 | Status: SHIPPED | OUTPATIENT
Start: 2018-04-17 | End: 2018-07-11 | Stop reason: SDUPTHER

## 2018-04-09 RX ORDER — HYDROCODONE BITARTRATE AND ACETAMINOPHEN 10; 325 MG/1; MG/1
1 TABLET ORAL
Qty: 120 TAB | Refills: 0 | Status: SHIPPED | OUTPATIENT
Start: 2018-06-15 | End: 2018-07-11 | Stop reason: SDUPTHER

## 2018-04-09 RX ORDER — HYDROCODONE BITARTRATE AND ACETAMINOPHEN 10; 325 MG/1; MG/1
1 TABLET ORAL
Qty: 120 TAB | Refills: 0 | Status: SHIPPED | OUTPATIENT
Start: 2018-04-17 | End: 2018-07-11 | Stop reason: SDUPTHER

## 2018-04-09 NOTE — PROGRESS NOTES
Nursing Notes    Patient presents to the office today in follow-up. Patient rates her pain at 3/10 on the numerical pain scale. Reviewed medications with counts as follows:    Rx Date filled Qty Dispensed Pill Count Last Dose Short   Norco 10 mg 03/18/19 120 38 Last pm no   Ms. Contin Er 30 mg 03/18/18 30 8 This am no         Comments: Patient is here today for a follow up appt today she states her pain level today is a 3  She states labs were taken at her pcm office and. She states she is getting out of a flare. POC UDS was performed in office today per verbal order per [facility]    Any new labs or imaging since last appointment? YES PCM     Have you been to an emergency room (ER) or urgent care clinic since your last visit? NO            Have you been hospitalized since your last visit? NO     If yes, where, when, and reason for visit? Have you seen or consulted any other health care providers outside of the [address] [place] [address]  since your last visit? YES PCM      If yes, where, when, and reason for visit? HM deferred to pcp.

## 2018-04-09 NOTE — MR AVS SNAPSHOT
[address] [facility] [address] 
[phone] Patient: Telly Calix MRN: [id number] IYN:[id number][birth year] Visit Information Date & Time Provider Department Dept. Phone Encounter #  
 4/9/2018  8:40 AM Berry Ramirez, [address] [facility] [phone] Follow-up Instructions Return in about 3 months (around 7/9/2018). Upcoming Health Maintenance Date Due Hepatitis C Screening 1960 Pneumococcal 19-64 Medium Risk (1 of 1 - PPSV23) 4/23/1979 DTaP/Tdap/Td series (1 - Tdap) 4/23/1981 PAP AKA CERVICAL CYTOLOGY 4/23/1981 BREAST CANCER SCRN MAMMOGRAM 4/23/2010 FOBT Q 1 YEAR AGE 50-75 4/23/2010 Influenza Age 5 to Adult 8/1/2017 Allergies as of 4/9/2018  Review Complete On: 4/9/2018 By: PRIYANKA Morillo Severity Noted Reaction Type Reactions Iodinated Contrast- Oral And Iv Dye High 12/19/2012   Side Effect Anaphylaxis Compazine [Prochlorperazine Edisylate]  12/19/2012   Side Effect Angioedema Dystonic tongue Levaquin [Levofloxacin]  12/19/2012    Hives Prednisone  12/19/2012    Anxiety Current Immunizations  Never Reviewed Name Date Influenza Vaccine 10/12/2014 Not reviewed this visit You Were Diagnosed With   
  
 Codes Comments Encounter for long-term (current) use of high-risk medication    -  Primary ICD-10-CM: T11.168 ICD-9-CM: V58.69 Chronic bilateral low back pain without sciatica     ICD-10-CM: M54.5, G89.29 ICD-9-CM: 724.2, 338.29 Bilateral sacroiliitis (HCC)     ICD-10-CM: M46.1 ICD-9-CM: 720.2 Spinal stenosis of lumbar region, unspecified whether neurogenic claudication present     ICD-10-CM: M48.061 
ICD-9-CM: 724.02 Chronic neck pain     ICD-10-CM: M54.2, G89.29 ICD-9-CM: 723.1, 338.29 Sacroiliac joint pain     ICD-10-CM: M53.3 ICD-9-CM: 724.6 Postlaminectomy syndrome, lumbar region     ICD-10-CM: M96.1 ICD-9-CM: 722.83 Vitals BP Pulse Temp Resp Height(growth percentile) Weight(growth percentile) 130/81 (BP 1 Location: Left arm, BP Patient Position: Sitting) 84 97.1 °F (36.2 °C) 16 5' 6\" (1.676 m) 172 lb (78 kg) BMI OB Status Smoking Status 27.76 kg/m2 Hysterectomy Current Every Day Smoker BMI and BSA Data Body Mass Index Body Surface Area  
 27.76 kg/m 2 1.91 m 2 Preferred Pharmacy Pharmacy Name Phone [address], 1 [facility] [phone] Your Updated Medication List  
  
   
This list is accurate as of 4/9/18  9:36 AM.  Always use your most recent med list.  
  
  
  
  
 AMBIEN 10 mg tablet Generic drug:  zolpidem Take  by mouth nightly as needed for Sleep.  
  
 gabapentin 300 mg capsule Commonly known as:  NEURONTIN Take 300 mg by mouth three (3) times daily. * morphine CR 30 mg CR tablet Commonly known as:  MS CONTIN Take 30 mg by mouth daily. * morphine CR 30 mg CR tablet Commonly known as:  MS CONTIN Take 1 Tab by mouth every twenty-four (24) hours for 30 days. Max Daily Amount: 30 mg. for chronic, severe, refractory pain  Indications: Chronic Pain, NEUROPATHIC PAIN, Severe Pain Start taking on:  4/17/2018 * morphine CR 30 mg CR tablet Commonly known as:  MS CONTIN Take 1 Tab by mouth every twenty-four (24) hours for 30 days. Max Daily Amount: 30 mg. for chronic, severe, refractory pain  Indications: Chronic Pain, NEUROPATHIC PAIN, Severe Pain Start taking on:  5/16/2018 * morphine CR 30 mg CR tablet Commonly known as:  MS CONTIN Take 1 Tab by mouth every twenty-four (24) hours for 30 days. Max Daily Amount: 30 mg. for chronic, severe, refractory pain  Indications: Chronic Pain, NEUROPATHIC PAIN, Severe Pain Start taking on:  6/15/2018  
  
 naloxone 4 mg/actuation nasal spray Commonly known as:  NARCAN  
4 mg by Nasal route as needed. For emergency use only  Indications: OPIATE-INDUCED RESPIRATORY DEPRESSION  
  
 * NORCO  mg tablet Generic drug:  HYDROcodone-acetaminophen Take 1 Tab by mouth every six (6) hours as needed for Pain. * HYDROcodone-acetaminophen  mg tablet Commonly known as:  Megan Foxe Take 1 Tab by mouth four (4) times daily as needed for Pain for up to 30 days. Max Daily Amount: 4 Tabs. Indications: Pain Start taking on:  4/17/2018  
  
 * HYDROcodone-acetaminophen  mg tablet Commonly known as:  Megan Foxe Take 1 Tab by mouth four (4) times daily as needed for Pain for up to 30 days. Max Daily Amount: 4 Tabs. Indications: Pain Start taking on:  5/16/2018  
  
 * HYDROcodone-acetaminophen  mg tablet Commonly known as:  Megan Foxe Take 1 Tab by mouth four (4) times daily as needed for Pain for up to 30 days. Max Daily Amount: 4 Tabs. Indications: Pain Start taking on:  6/15/2018  
  
 traZODone 100 mg tablet Commonly known as:  Ryann Hand Take 2 Tabs by mouth nightly. For chronic insomnia VALIUM 10 mg tablet Generic drug:  diazePAM  
Take 10 mg by mouth every twelve (12) hours as needed for Anxiety. * Notice: This list has 8 medication(s) that are the same as other medications prescribed for you. Read the directions carefully, and ask your doctor or other care provider to review them with you. Prescriptions Printed Refills  
 morphine CR (MS CONTIN) 30 mg CR tablet 0 Starting on: 4/17/2018 Sig: Take 1 Tab by mouth every twenty-four (24) hours for 30 days. Max Daily Amount: 30 mg. for chronic, severe, refractory pain  Indications: Chronic Pain, NEUROPATHIC PAIN, Severe Pain Class: Print Route: Oral  
 morphine CR (MS CONTIN) 30 mg CR tablet 0 Starting on: 5/16/2018 Sig: Take 1 Tab by mouth every twenty-four (24) hours for 30 days.  Max Daily Amount: 30 mg. for chronic, severe, refractory pain  Indications: Chronic Pain, NEUROPATHIC PAIN, Severe Pain Class: Print Route: Oral  
 morphine CR (MS CONTIN) 30 mg CR tablet 0 Starting on: 6/15/2018 Sig: Take 1 Tab by mouth every twenty-four (24) hours for 30 days. Max Daily Amount: 30 mg. for chronic, severe, refractory pain  Indications: Chronic Pain, NEUROPATHIC PAIN, Severe Pain Class: Print Route: Oral  
 HYDROcodone-acetaminophen (NORCO)  mg tablet 0 Starting on: 4/17/2018 Sig: Take 1 Tab by mouth four (4) times daily as needed for Pain for up to 30 days. Max Daily Amount: 4 Tabs. Indications: Pain Class: Print Route: Oral  
 HYDROcodone-acetaminophen (NORCO)  mg tablet 0 Starting on: 5/16/2018 Sig: Take 1 Tab by mouth four (4) times daily as needed for Pain for up to 30 days. Max Daily Amount: 4 Tabs. Indications: Pain Class: Print Route: Oral  
 HYDROcodone-acetaminophen (NORCO)  mg tablet 0 Starting on: 6/15/2018 Sig: Take 1 Tab by mouth four (4) times daily as needed for Pain for up to 30 days. Max Daily Amount: 4 Tabs. Indications: Pain Class: Print Route: Oral  
  
We Performed the Following AMB POC DRUG SCREEN () [ [facility]] DRUG SCREEN [[id number] Custom] Follow-up Instructions Return in about 3 months (around 7/9/2018). Patient Instructions 1. Continue current plan with no evidence of addiction or diversion. Stable on current medication without adverse events. 2. Refill hydrocodone 10/325 mg up to 4 times daily as needed for pain. 3. Refill  morphine ER 30 mg  once daily. 4. Naloxone 4 mg nasal spray for opioid induced respiratory depression emergency only. 5. Consider repeat left piriformis Injection with Dr. Guerline Fountain if needed 6. Continue Home exercise program.  
7. Discussed risks of addiction, dependency, and opioid induced hyperalgesia. 8. RTC in 3 months Introducing [facility]! Dear Liaz Shane: Thank you for requesting a [employer] account. Our records indicate that you already have an active [employer] account. You can access your account anytime at [email or web address] Did you know that you can access your hospital and ER discharge instructions at any time in [employer]? You can also review all of your test results from your hospital stay or ER visit. Additional Information If you have questions, please visit the Frequently Asked Questions section of the [employer] website at [email or web address] Remember, [employer] is NOT to be used for urgent needs. For medical emergencies, dial 911. Now available from your iPhone and Android! Please provide this summary of care documentation to your next provider. Your primary care clinician is listed as Gayatri Gotti. If you have any questions after today's visit, please call [phone].

## 2018-04-09 NOTE — PROGRESS NOTES
HISTORY OF PRESENT ILLNESS  Hiram David is a 62 y.o. female    HPI: Ms. Clayton Barros  returns today for f/u of chronic low back pain, Neck pain, and polyarticular pain. H/o 2 lumbar surgery and 1 cervical fusion. She reports good improvement from her surgeries. She completed PT with some benefit. She did try prior epidurals prior to her surgery with no improvement, none since. Recent surgery for lumbar hernia 3/3/16. Left piriformis injection by Dr. Suzen Hammans on 5/24/2017 with good improvement    Ms. Dodd continues unchanged since last visit. she continues to do well with her current treatment plan which has been been offering significant pain control. She reports no new complaints today. She will continue with her current treatment plan with no changes. I will have her follow-up in 3 months for further evaluation and recommendation. Medication management consists of Morphine ER 30 mg QD and hydrocodone 10/325 QID PRN. Valium, zolpidem, and Trazadon by her PCP. Medications are helping with pain control and quality of life. Her pain is 3-4/10 with medication and 6-7/10 without. Pt describes pain as aching and burning. Aggravating factors include standing and  walking. Relieved with rest, medication, lying down, and avoiding painful activities. Current treatment is helping to improve general activity, mood, walking, sleep, enjoyment of life. Pt does report constipation but under better control with MiraLAX and conservative treatment. She  is otherwise doing well with no other complaints today. She denies any adverse events including nausea, vomiting, dizziness, constipation, hallucinations, or seizures. Because the patient's current regimen places him/her at increased risk for possible overdose, a prescription for naloxone nasal spray has been provided.   The patient understands that this medication is only to be used in the setting of a possible overdose and that inadvertent use of this medication could precipitate overt withdrawal.    Attended education class 2016    POC UDS today. Confirmation pending. PRIOR IMAGIN. Lumbar CT 16: 1. L3-L4 intervertebral and posterior spinous process fusion. No fracture lumbar spine. Asymmetric thickening of the left piriformis muscle, may be post op or trauma. Allergies   Allergen Reactions    Iodinated Contrast- Oral And Iv Dye Anaphylaxis    Compazine [Prochlorperazine Edisylate] Angioedema     Dystonic tongue    Levaquin [Levofloxacin] Hives    Prednisone Anxiety       Past Surgical History:   Procedure Laterality Date    HX APPENDECTOMY      HX CERVICAL DISKECTOMY      HX  SECTION  , , 95    HX CHOLECYSTECTOMY      HX HYSTERECTOMY      total    HX LUMBAR DISKECTOMY      HX LYSIS OF ADHESIONS      HX ORTHOPAEDIC      back surgery with hardware    HX OTHER SURGICAL  2006    Bladder suspension-          Review of Systems   Constitutional: Negative. Negative for chills and fever. HENT: Negative for congestion and sore throat. Eyes: Negative for blurred vision and double vision. Respiratory: Negative for cough, shortness of breath and wheezing. Cardiovascular: Negative for chest pain and palpitations. Gastrointestinal: Negative for constipation, heartburn, nausea and vomiting. Genitourinary: Negative. Musculoskeletal: Positive for back pain, joint pain, myalgias and neck pain. Skin: Negative for itching and rash. Neurological: Negative for dizziness, seizures, loss of consciousness and headaches. Psychiatric/Behavioral: Negative. Negative for depression, hallucinations and suicidal ideas. The patient is not nervous/anxious. Physical Exam   Constitutional: She is oriented to person, place, and time and well-developed, well-nourished, and in no distress. No distress. HENT:   Head: Normocephalic and atraumatic.    Eyes: EOM are normal.   Neck: Normal range of motion. Pulmonary/Chest: Effort normal.   Musculoskeletal: Normal range of motion. Tender palpation in left piriformis aspect with mild spasm noted. Neurological: She is alert and oriented to person, place, and time. She has normal reflexes. Skin: Skin is warm and dry. No rash noted. She is not diaphoretic. No erythema. Psychiatric: Mood, memory, affect and judgment normal.   Nursing note and vitals reviewed. ASSESSMENT:    1. Encounter for long-term (current) use of high-risk medication    2. Chronic bilateral low back pain without sciatica    3. Bilateral sacroiliitis ([address]    4. Spinal stenosis of lumbar region, unspecified whether neurogenic claudication present    5. Chronic neck pain    6. Sacroiliac joint pain    7. Postlaminectomy syndrome, lumbar region         [facility] Program was reviewed which does not demonstrate aberrancies and/or inconsistencies with regard to the historical prescribing of controlled medications to this patient by other providers. NOTE: Disclosed prescriptions for postsurgical pain 3/3, 3/10, 3/18, and 4/2. Cough syrup with hydrocodone 6/14, and 6/23. She reports that she did receive temporary rx for Valium by her PCP filled 11/11. PLAN / Pt Instructions:  1. Continue current plan with no evidence of addiction or diversion. Stable on current medication without adverse events. 2. Refill hydrocodone 10/325 mg up to 4 times daily as needed for pain. 3. Refill  morphine ER 30 mg  once daily. 4. Naloxone 4 mg nasal spray for opioid induced respiratory depression emergency only. 5. Consider repeat left piriformis Injection with Dr. Emily Escobar if needed   6. Continue Home exercise program.   7. Discussed risks of addiction, dependency, and opioid induced hyperalgesia.    8. RTC in 3 months     Medications Ordered Today   Medications    morphine CR (MS CONTIN) 30 mg CR tablet     Sig: Take 1 Tab by mouth every twenty-four (24) hours for 30 days. Max Daily Amount: 30 mg. for chronic, severe, refractory pain  Indications: Chronic Pain, NEUROPATHIC PAIN, Severe Pain     Dispense:  30 Tab     Refill:  0    morphine CR (MS CONTIN) 30 mg CR tablet     Sig: Take 1 Tab by mouth every twenty-four (24) hours for 30 days. Max Daily Amount: 30 mg. for chronic, severe, refractory pain  Indications: Chronic Pain, NEUROPATHIC PAIN, Severe Pain     Dispense:  30 Tab     Refill:  0    morphine CR (MS CONTIN) 30 mg CR tablet     Sig: Take 1 Tab by mouth every twenty-four (24) hours for 30 days. Max Daily Amount: 30 mg. for chronic, severe, refractory pain  Indications: Chronic Pain, NEUROPATHIC PAIN, Severe Pain     Dispense:  30 Tab     Refill:  0    HYDROcodone-acetaminophen (NORCO)  mg tablet     Sig: Take 1 Tab by mouth four (4) times daily as needed for Pain for up to 30 days. Max Daily Amount: 4 Tabs. Indications: Pain     Dispense:  120 Tab     Refill:  0    HYDROcodone-acetaminophen (NORCO)  mg tablet     Sig: Take 1 Tab by mouth four (4) times daily as needed for Pain for up to 30 days. Max Daily Amount: 4 Tabs. Indications: Pain     Dispense:  120 Tab     Refill:  0    HYDROcodone-acetaminophen (NORCO)  mg tablet     Sig: Take 1 Tab by mouth four (4) times daily as needed for Pain for up to 30 days. Max Daily Amount: 4 Tabs. Indications: Pain     Dispense:  120 Tab     Refill:  0       Pain medications prescribed with the objective of pain relief and improved physical and psychosocial function in this patient. Spent 25 minutes with patient today reviewing the treatment plan, goals of treatment plan, and limitations of the treatment plan, to include the potential for side effects from medications and procedures. Lily Haley 4/9/2018      Note: Please excuse any typographical errors. Voice recognition software was used for this note and may cause mistakes.

## 2018-04-09 NOTE — PATIENT INSTRUCTIONS
1. Continue current plan with no evidence of addiction or diversion. Stable on current medication without adverse events. 2. Refill hydrocodone 10/325 mg up to 4 times daily as needed for pain. 3. Refill  morphine ER 30 mg  once daily. 4. Naloxone 4 mg nasal spray for opioid induced respiratory depression emergency only. 5. Consider repeat left piriformis Injection with Dr. Shukri Barrientos if needed   6. Continue Home exercise program.   7. Discussed risks of addiction, dependency, and opioid induced hyperalgesia.    8. RTC in 3 months

## 2018-07-11 ENCOUNTER — OFFICE VISIT (OUTPATIENT)
Dept: PAIN MANAGEMENT | Age: 58
End: 2018-07-11

## 2018-07-11 VITALS
HEIGHT: 66 IN | RESPIRATION RATE: 16 BRPM | DIASTOLIC BLOOD PRESSURE: 82 MMHG | TEMPERATURE: 97 F | BODY MASS INDEX: 27.64 KG/M2 | HEART RATE: 86 BPM | SYSTOLIC BLOOD PRESSURE: 118 MMHG | WEIGHT: 172 LBS

## 2018-07-11 DIAGNOSIS — G89.29 CHRONIC BILATERAL LOW BACK PAIN WITHOUT SCIATICA: ICD-10-CM

## 2018-07-11 DIAGNOSIS — M51.37 DEGENERATION OF LUMBAR OR LUMBOSACRAL INTERVERTEBRAL DISC: ICD-10-CM

## 2018-07-11 DIAGNOSIS — M46.1 BILATERAL SACROILIITIS (HCC): ICD-10-CM

## 2018-07-11 DIAGNOSIS — M48.061 SPINAL STENOSIS OF LUMBAR REGION, UNSPECIFIED WHETHER NEUROGENIC CLAUDICATION PRESENT: ICD-10-CM

## 2018-07-11 DIAGNOSIS — M53.3 SACROILIAC JOINT PAIN: ICD-10-CM

## 2018-07-11 DIAGNOSIS — M47.812 OSTEOARTHRITIS OF CERVICAL SPINE, UNSPECIFIED SPINAL OSTEOARTHRITIS COMPLICATION STATUS: ICD-10-CM

## 2018-07-11 DIAGNOSIS — M96.1 POSTLAMINECTOMY SYNDROME, LUMBAR REGION: ICD-10-CM

## 2018-07-11 DIAGNOSIS — M54.50 CHRONIC BILATERAL LOW BACK PAIN WITHOUT SCIATICA: ICD-10-CM

## 2018-07-11 RX ORDER — ESCITALOPRAM OXALATE 10 MG/1
10 TABLET ORAL DAILY
COMMUNITY
End: 2020-06-17

## 2018-07-11 RX ORDER — HYDROCODONE BITARTRATE AND ACETAMINOPHEN 10; 325 MG/1; MG/1
1 TABLET ORAL
Qty: 120 TAB | Refills: 0 | Status: SHIPPED | OUTPATIENT
Start: 2018-08-14 | End: 2018-09-13

## 2018-07-11 RX ORDER — MORPHINE SULFATE 30 MG/1
30 TABLET, FILM COATED, EXTENDED RELEASE ORAL EVERY 24 HOURS
Qty: 30 TAB | Refills: 0 | Status: SHIPPED | OUTPATIENT
Start: 2018-07-15 | End: 2018-08-14

## 2018-07-11 RX ORDER — MORPHINE SULFATE 30 MG/1
30 TABLET, FILM COATED, EXTENDED RELEASE ORAL EVERY 24 HOURS
Qty: 30 TAB | Refills: 0 | Status: SHIPPED | OUTPATIENT
Start: 2018-08-14 | End: 2018-09-13

## 2018-07-11 RX ORDER — HYDROCODONE BITARTRATE AND ACETAMINOPHEN 10; 325 MG/1; MG/1
1 TABLET ORAL
Qty: 120 TAB | Refills: 0 | Status: SHIPPED | OUTPATIENT
Start: 2018-09-13 | End: 2018-10-13

## 2018-07-11 RX ORDER — MORPHINE SULFATE 30 MG/1
30 TABLET, FILM COATED, EXTENDED RELEASE ORAL EVERY 24 HOURS
Qty: 30 TAB | Refills: 0 | Status: SHIPPED | OUTPATIENT
Start: 2018-09-13 | End: 2018-10-13

## 2018-07-11 RX ORDER — HYDROCODONE BITARTRATE AND ACETAMINOPHEN 10; 325 MG/1; MG/1
1 TABLET ORAL
Qty: 120 TAB | Refills: 0 | Status: SHIPPED | OUTPATIENT
Start: 2018-07-15 | End: 2018-08-14

## 2018-07-11 NOTE — MR AVS SNAPSHOT
[address] [facility] [address] 
[phone] Patient: Ira Manning MRN: [id number] Visit Information Date & Time Provider Department Dept. Phone Encounter #  
 7/11/2018  8:40 AM Merton Kanner, [address] [place] [address] for Pain Management  Follow-up Instructions Return in about 3 months (around 10/11/2018). Upcoming Health Maintenance Date Due Hepatitis C Screening 1960 Pneumococcal 19-64 Medium Risk (1 of 1 - PPSV23) 4/23/1979 DTaP/Tdap/Td series (1 - Tdap) 4/23/1981 PAP AKA CERVICAL CYTOLOGY 4/23/1981 BREAST CANCER SCRN MAMMOGRAM 4/23/2010 FOBT Q 1 YEAR AGE 50-75 4/23/2010 Influenza Age 5 to Adult 8/1/2018 Allergies as of 7/11/2018  Review Complete On: 7/11/2018 By: Merton Kanner, PA Severity Noted Reaction Type Reactions Iodinated Contrast- Oral And Iv Dye High 12/19/2012   Side Effect Anaphylaxis Compazine [Prochlorperazine Edisylate]  12/19/2012   Side Effect Angioedema Dystonic tongue Levaquin [Levofloxacin]  12/19/2012    Hives Prednisone  12/19/2012    Anxiety Current Immunizations  Never Reviewed Name Date Influenza Vaccine 10/12/2014 Not reviewed this visit You Were Diagnosed With   
  
 Codes Comments Chronic bilateral low back pain without sciatica     ICD-10-CM: M54.5, G89.29 ICD-9-CM: 724.2, 338.29 Bilateral sacroiliitis (HCC)     ICD-10-CM: M46.1 ICD-9-CM: 720.2 Spinal stenosis of lumbar region, unspecified whether neurogenic claudication present     ICD-10-CM: M48.061 
ICD-9-CM: 724.02 Degeneration of lumbar or lumbosacral intervertebral disc     ICD-10-CM: M51.37 
ICD-9-CM: 722.52 Osteoarthritis of cervical spine, unspecified spinal osteoarthritis complication status     MGS-23-LB: K61.020 ICD-9-CM: 721.0 Sacroiliac joint pain     ICD-10-CM: M53.3 ICD-9-CM: 724.6 Postlaminectomy syndrome, lumbar region     ICD-10-CM: M96.1 ICD-9-CM: 722.83 Vitals BP Pulse Temp Resp Height(growth percentile) Weight(growth percentile) 118/82 (BP 1 Location: Left arm, BP Patient Position: Sitting) 86 97 °F (36.1 °C) 16 5' 6\" (1.676 m) 172 lb (78 kg) BMI OB Status Smoking Status 27.76 kg/m2 Hysterectomy Current Every Day Smoker BMI and BSA Data Body Mass Index Body Surface Area  
 27.76 kg/m 2 1.91 m 2 Preferred Pharmacy Pharmacy Name Phone [address], [address] [facility] [phone] Your Updated Medication List  
  
   
This list is accurate as of 7/11/18  9:03 AM.  Always use your most recent med list.  
  
  
  
  
 AMBIEN 10 mg tablet Generic drug:  zolpidem Take  by mouth nightly as needed for Sleep.  
  
 gabapentin 300 mg capsule Commonly known as:  NEURONTIN Take 300 mg by mouth three (3) times daily. LEXAPRO 10 mg tablet Generic drug:  escitalopram oxalate Take 10 mg by mouth daily. * morphine CR 30 mg CR tablet Commonly known as:  MS CONTIN Take 30 mg by mouth daily. * morphine CR 30 mg CR tablet Commonly known as:  MS CONTIN Take 1 Tab by mouth every twenty-four (24) hours for 30 days. Max Daily Amount: 30 mg. for chronic, severe, refractory pain  Indications: Chronic Pain, NEUROPATHIC PAIN, Severe Pain Start taking on:  7/15/2018 * morphine CR 30 mg CR tablet Commonly known as:  MS CONTIN Take 1 Tab by mouth every twenty-four (24) hours for 30 days. Max Daily Amount: 30 mg. for chronic, severe, refractory pain  Indications: Chronic Pain, NEUROPATHIC PAIN, Severe Pain Start taking on:  8/14/2018 * morphine CR 30 mg CR tablet Commonly known as:  MS CONTIN Take 1 Tab by mouth every twenty-four (24) hours for 30 days.  Max Daily Amount: 30 mg. for chronic, severe, refractory pain  Indications: Chronic Pain, NEUROPATHIC PAIN, Severe Pain Start taking on:  9/13/2018  
  
 naloxone 4 mg/actuation nasal spray Commonly known as:  NARCAN  
4 mg by Nasal route as needed. For emergency use only  Indications: OPIATE-INDUCED RESPIRATORY DEPRESSION  
  
 * NORCO  mg tablet Generic drug:  HYDROcodone-acetaminophen Take 1 Tab by mouth every six (6) hours as needed for Pain. * HYDROcodone-acetaminophen  mg tablet Commonly known as:  March Castles Take 1 Tab by mouth four (4) times daily as needed for Pain for up to 30 days. Max Daily Amount: 4 Tabs. Indications: Pain Start taking on:  7/15/2018  
  
 * HYDROcodone-acetaminophen  mg tablet Commonly known as:  March Castles Take 1 Tab by mouth four (4) times daily as needed for Pain for up to 30 days. Max Daily Amount: 4 Tabs. Indications: Pain Start taking on:  8/14/2018  
  
 * HYDROcodone-acetaminophen  mg tablet Commonly known as:  March Castles Take 1 Tab by mouth four (4) times daily as needed for Pain for up to 30 days. Max Daily Amount: 4 Tabs. Indications: Pain Start taking on:  9/13/2018  
  
 traZODone 100 mg tablet Commonly known as:  Lewis Oh Take 2 Tabs by mouth nightly. For chronic insomnia VALIUM 10 mg tablet Generic drug:  diazePAM  
Take 10 mg by mouth every twelve (12) hours as needed for Anxiety. * Notice: This list has 8 medication(s) that are the same as other medications prescribed for you. Read the directions carefully, and ask your doctor or other care provider to review them with you. Prescriptions Printed Refills  
 morphine CR (MS CONTIN) 30 mg CR tablet 0 Starting on: 7/15/2018 Sig: Take 1 Tab by mouth every twenty-four (24) hours for 30 days. Max Daily Amount: 30 mg. for chronic, severe, refractory pain  Indications: Chronic Pain, NEUROPATHIC PAIN, Severe Pain Class: Print Route: Oral  
 HYDROcodone-acetaminophen (NORCO)  mg tablet 0 Starting on: 7/15/2018 Sig: Take 1 Tab by mouth four (4) times daily as needed for Pain for up to 30 days. Max Daily Amount: 4 Tabs. Indications: Pain Class: Print Route: Oral  
 morphine CR (MS CONTIN) 30 mg CR tablet 0 Starting on: 8/14/2018 Sig: Take 1 Tab by mouth every twenty-four (24) hours for 30 days. Max Daily Amount: 30 mg. for chronic, severe, refractory pain  Indications: Chronic Pain, NEUROPATHIC PAIN, Severe Pain Class: Print Route: Oral  
 morphine CR (MS CONTIN) 30 mg CR tablet 0 Starting on: 9/13/2018 Sig: Take 1 Tab by mouth every twenty-four (24) hours for 30 days. Max Daily Amount: 30 mg. for chronic, severe, refractory pain  Indications: Chronic Pain, NEUROPATHIC PAIN, Severe Pain Class: Print Route: Oral  
 HYDROcodone-acetaminophen (NORCO)  mg tablet 0 Starting on: 8/14/2018 Sig: Take 1 Tab by mouth four (4) times daily as needed for Pain for up to 30 days. Max Daily Amount: 4 Tabs. Indications: Pain Class: Print Route: Oral  
 HYDROcodone-acetaminophen (NORCO)  mg tablet 0 Starting on: 9/13/2018 Sig: Take 1 Tab by mouth four (4) times daily as needed for Pain for up to 30 days. Max Daily Amount: 4 Tabs. Indications: Pain Class: Print Route: Oral  
  
Follow-up Instructions Return in about 3 months (around 10/11/2018). Patient Instructions 1. Continue current plan with no evidence of addiction or diversion. Stable on current medication without adverse events. 2. Refill hydrocodone 10/325 mg up to 4 times daily as needed for pain. 3. Refill  morphine ER 30 mg  once daily. 4. Please discuss alternative treatments regarding Ambien and Valium with your PCP as soon as possible as we were no longer be able to prescribe opioid medications while you are concurrently taking benzodiazepines. 5. Naloxone 4 mg nasal spray for opioid induced respiratory depression emergency only. 6. Consider repeat left piriformis Injection with Dr. Kaylie Ortiz if needed 7. Continue Home exercise program.  
8. Discussed risks of addiction, dependency, and opioid induced hyperalgesia. Please remember to call at least 4-5 business days prior to your medication refill. Return to clinic in 3 months. Please call and cancel your appointment and pain management agreement if you do decide to transfer your care. Introducing [facility] & [facility] SERVICES! Dear Rodriguez lora: Thank you for requesting a goOutMap account. Our records indicate that you already have an active goOutMap account. You can access your account anytime at [email or web address] Did you know that you can access your hospital and ER discharge instructions at any time in goOutMap? You can also review all of your test results from your hospital stay or ER visit. Additional Information If you have questions, please visit the Frequently Asked Questions section of the goOutMap website at [email or web address] Remember, goOutMap is NOT to be used for urgent needs. For medical emergencies, dial 911. Now available from your iPhone and Android! Please provide this summary of care documentation to your next provider. Your primary care clinician is listed as Chela Fatima. If you have any questions after today's visit, please call [phone].

## 2018-07-11 NOTE — PATIENT INSTRUCTIONS
1. Continue current plan with no evidence of addiction or diversion. Stable on current medication without adverse events. 2. Refill hydrocodone 10/325 mg up to 4 times daily as needed for pain. 3. Refill  morphine ER 30 mg  once daily. 4. Please discuss alternative treatments regarding Ambien and Valium with your PCP as soon as possible as we were no longer be able to prescribe opioid medications while you are concurrently taking benzodiazepines. 5. Naloxone 4 mg nasal spray for opioid induced respiratory depression emergency only. 6. Consider repeat left piriformis Injection with Dr. Catherine Atkins if needed   7. Continue Home exercise program.   8. Discussed risks of addiction, dependency, and opioid induced hyperalgesia. Please remember to call at least 4-5 business days prior to your medication refill. Return to clinic in 3 months. Please call and cancel your appointment and pain management agreement if you do decide to transfer your care.

## 2018-07-11 NOTE — PROGRESS NOTES
HISTORY OF PRESENT ILLNESS  Colonel Alfaro is a 62 y.o. female    HPI: Ms. Negin Roman  returns today for f/u of chronic low back pain, Neck pain, and polyarticular pain. H/o 2 lumbar surgery and 1 cervical fusion. She reports good improvement from her surgeries. She completed PT with some benefit. She did try prior epidurals prior to her surgery with no improvement, none since. Recent surgery for lumbar hernia 3/3/16. Left piriformis injection by Dr. Jg Goldberg on 5/24/2017 with good improvement    Ms. Negin Roman continues to do well with her current treatment plan which has been offering significant pain control. She reports no significant changes since her last visit. We spent most of today's visit discussing changes to our practice. I explained to her that moving forward we will be focusing on more conservative and non-opioid plan of care. This will result in changes to her current treatment plan. We will continue with her current treatment plan for today but she understands that we will begin tapering her medications next visit. We did discuss some options in the office today and will discuss further options moving forward. She has expressed her concerns with changes to our practice and states that she will most likely be transferring her care but has not made a formal decision at this time. I have asked her to call and cancel her appointment and pain management agreement if she does decide to transfer care. We discussed risks associated with opioids and benzodiazepines. I explained to her that we would no longer be able to prescribe opioids while she is concurrently taking benzodiazepines. I have asked her to discuss alternative treatments regarding Valium and Ambien with her PCP as soon as possible. Medication management consists of Morphine ER 30 mg QD and hydrocodone 10/325 QID PRN. Valium, zolpidem, and Trazadon by her PCP. Medications are helping with pain control and quality of life.  Her pain is 3-4/10 with medication and 6-7/10 without. Pt describes pain as aching and burning. Aggravating factors include standing and  walking. Relieved with rest, medication, lying down, and avoiding painful activities. Current treatment is helping to improve general activity, mood, walking, sleep, enjoyment of life. Pt does report constipation but under better control with MiraLAX and conservative treatment. She  is otherwise doing well with no other complaints today. She denies any adverse events including nausea, vomiting, dizziness, constipation, hallucinations, or seizures. Because the patient's current regimen places him/her at increased risk for possible overdose, a prescription for naloxone nasal spray has been provided. The patient understands that this medication is only to be used in the setting of a possible overdose and that inadvertent use of this medication could precipitate overt withdrawal.    Attended education class 2016      PRIOR IMAGIN. Lumbar CT 16: 1. L3-L4 intervertebral and posterior spinous process fusion. No fracture lumbar spine. Asymmetric thickening of the left piriformis muscle, may be post op or trauma. Allergies   Allergen Reactions    Iodinated Contrast- Oral And Iv Dye Anaphylaxis    Compazine [Prochlorperazine Edisylate] Angioedema     Dystonic tongue    Levaquin [Levofloxacin] Hives    Prednisone Anxiety       Past Surgical History:   Procedure Laterality Date    HX APPENDECTOMY      HX CERVICAL DISKECTOMY      HX  SECTION  1987, 93, 95    HX CHOLECYSTECTOMY      HX HYSTERECTOMY      total    HX LUMBAR DISKECTOMY      HX LYSIS OF ADHESIONS      HX ORTHOPAEDIC      back surgery with hardware    HX OTHER SURGICAL  2006    Bladder suspension-          Review of Systems   Constitutional: Negative. Negative for chills and fever. HENT: Negative for congestion and sore throat.     Eyes: Negative for blurred vision and double vision. Respiratory: Negative for cough, shortness of breath and wheezing. Cardiovascular: Negative for chest pain and palpitations. Gastrointestinal: Negative for constipation, heartburn, nausea and vomiting. Genitourinary: Negative. Musculoskeletal: Positive for back pain, joint pain, myalgias and neck pain. Skin: Negative for itching and rash. Neurological: Negative for dizziness, seizures, loss of consciousness and headaches. Psychiatric/Behavioral: Negative. Negative for depression, hallucinations and suicidal ideas. The patient is not nervous/anxious. Physical Exam   Constitutional: She is oriented to person, place, and time and well-developed, well-nourished, and in no distress. No distress. HENT:   Head: Normocephalic and atraumatic. Eyes: EOM are normal.   Neck: Normal range of motion. Pulmonary/Chest: Effort normal.   Musculoskeletal: Normal range of motion. Tender palpation in left piriformis aspect with mild spasm noted. Neurological: She is alert and oriented to person, place, and time. She has normal reflexes. Skin: Skin is warm and dry. No rash noted. She is not diaphoretic. No erythema. Psychiatric: Mood, memory, affect and judgment normal.   Nursing note and vitals reviewed. ASSESSMENT:    1. Chronic bilateral low back pain without sciatica    2. Bilateral sacroiliitis ([address]    3. Spinal stenosis of lumbar region, unspecified whether neurogenic claudication present    4. Degeneration of lumbar or lumbosacral intervertebral disc    5. Osteoarthritis of cervical spine, unspecified spinal osteoarthritis complication status    6. Sacroiliac joint pain    7. Postlaminectomy syndrome, lumbar region         [facility] was reviewed which does not demonstrate aberrancies and/or inconsistencies with regard to the historical prescribing of controlled medications to this patient by other providers.   NOTE: Disclosed prescriptions for postsurgical pain 3/3, 3/10, 3/18, and 4/2. Cough syrup with hydrocodone 6/14, and 6/23. She reports that she did receive temporary rx for Valium by her PCP filled 11/11. PLAN / Pt Instructions:  1. Continue current plan with no evidence of addiction or diversion. Stable on current medication without adverse events. 2. Refill hydrocodone 10/325 mg up to 4 times daily as needed for pain. 3. Refill  morphine ER 30 mg  once daily. 4. Please discuss alternative treatments regarding Ambien and Valium with your PCP as soon as possible as we were no longer be able to prescribe opioid medications while you are concurrently taking benzodiazepines. 5. Naloxone 4 mg nasal spray for opioid induced respiratory depression emergency only. 6. Consider repeat left piriformis Injection with Dr. Kitty Marsh if needed   7. Continue Home exercise program.   8. Discussed risks of addiction, dependency, and opioid induced hyperalgesia. Please remember to call at least 4-5 business days prior to your medication refill. Return to clinic in 3 months. Please call and cancel your appointment and pain management agreement if you do decide to transfer your care. Medications Ordered Today   Medications    morphine CR (MS CONTIN) 30 mg CR tablet     Sig: Take 1 Tab by mouth every twenty-four (24) hours for 30 days. Max Daily Amount: 30 mg. for chronic, severe, refractory pain  Indications: Chronic Pain, NEUROPATHIC PAIN, Severe Pain     Dispense:  30 Tab     Refill:  0    HYDROcodone-acetaminophen (NORCO)  mg tablet     Sig: Take 1 Tab by mouth four (4) times daily as needed for Pain for up to 30 days. Max Daily Amount: 4 Tabs. Indications: Pain     Dispense:  120 Tab     Refill:  0    morphine CR (MS CONTIN) 30 mg CR tablet     Sig: Take 1 Tab by mouth every twenty-four (24) hours for 30 days.  Max Daily Amount: 30 mg. for chronic, severe, refractory pain  Indications: Chronic Pain, NEUROPATHIC PAIN, Severe Pain     Dispense:  30 Tab     Refill:  0    morphine CR (MS CONTIN) 30 mg CR tablet     Sig: Take 1 Tab by mouth every twenty-four (24) hours for 30 days. Max Daily Amount: 30 mg. for chronic, severe, refractory pain  Indications: Chronic Pain, NEUROPATHIC PAIN, Severe Pain     Dispense:  30 Tab     Refill:  0    HYDROcodone-acetaminophen (NORCO)  mg tablet     Sig: Take 1 Tab by mouth four (4) times daily as needed for Pain for up to 30 days. Max Daily Amount: 4 Tabs. Indications: Pain     Dispense:  120 Tab     Refill:  0    HYDROcodone-acetaminophen (NORCO)  mg tablet     Sig: Take 1 Tab by mouth four (4) times daily as needed for Pain for up to 30 days. Max Daily Amount: 4 Tabs. Indications: Pain     Dispense:  120 Tab     Refill:  0       DISPOSITION   Pain medications are prescribed with the objective of pain relief and improved physical and psychosocial function in this patient.  Patient has been counseled on proper use of prescribed medications.  Patient has been counseled about chronic medical conditions and their relationship to anxiety and depression and recommended mental health support as needed.  Reviewed with patient self-help tools, home exercise, and lifestyle changes to assist the patient in self-management of symptoms.  Reviewed with patient the treatment plan, goals of treatment plan, and limitations of treatment plan, to include the potential for side effects from medications and procedures. If side effects occur, it is the responsibility of the patient to inform the clinic so that a change in the treatment plan can be made in a safe manner. The patient is advised that stopping prescribed medication may cause an increase in symptoms and possible medication withdrawal symptoms. The patient is informed an emergency room evaluation may be necessary if this occurs.       Spent 25 minutes with patient today which more than 50% of that time was spent on counseling and coordination of care. Lily Lubin 7/11/2018      Note: Please excuse any typographical errors. Voice recognition software was used for this note and may cause mistakes.

## 2018-07-11 NOTE — PROGRESS NOTES
Nursing Notes    Patient presents to the office today in follow-up. Patient rates her pain at 4/10 on the numerical pain scale. Reviewed medications with counts as follows:    Rx Date filled Qty Dispensed Pill Count Last Dose Short   Norco 10 mg 06/16/18 120 24 Last pm no   Ms. Contin ER 30 mg 06/16/18 30 5 This am  no                 Comments: Patient is here today for a follow up appt today she states her pain level today is a 4  She states labs were taken at her pcm office. PHQ9 was done patient states she is now on antidepressant     POC UDS was not performed in office today    Any new labs or imaging since last appointment? YES labs taken     Have you been to an emergency room (ER) or urgent care clinic since your last visit? NO            Have you been hospitalized since your last visit? NO     If yes, where, when, and reason for visit? Have you seen or consulted any other health care providers outside of the [address] [place] [address]  since your last visit? YES   pcm   If yes, where, when, and reason for visit? Ms. Josseline Almonte has a reminder for a \"due or due soon\" health maintenance. I have asked that she contact her primary care provider for follow-up on this health maintenance.

## 2018-09-10 ENCOUNTER — TELEPHONE (OUTPATIENT)
Dept: PAIN MANAGEMENT | Age: 58
End: 2018-09-10

## 2018-09-10 NOTE — TELEPHONE ENCOUNTER
Myriam Guidry has called requesting a refill of their controlled medication, hydrocodone and morphine, for the management of hip/back pain. Last office visit date: 7/11/18 with Emanuel Gutierrez, next 10/3/18 with Emanuel Gutierrez    Date last  was pulled and reviewed : 9/10/18 last filled both 8/15/18    Was the patient compliant when the above report was pulled? yes    Analgesia: 80%    Aberrancies: none    ADL's: yes    Adverse Reaction: none    Provider's last note and plan of care reviewed? yes  Request forwarded to provider for review.

## 2018-10-02 ENCOUNTER — TELEPHONE (OUTPATIENT)
Dept: PAIN MANAGEMENT | Age: 58
End: 2018-10-02

## 2018-10-02 NOTE — TELEPHONE ENCOUNTER
Contacted patient regarding appointment scheduled for 10/03/18 at 2:00pm as she had pushed \"reschedule\" on the automated reminder system. Patient stated that she has an appointment with a new pain management doctor so she will not be rescheduling. I asked her if she was voluntarily discharging herself, she stated yes that's what she would like to do. I verified her address and let her know we would send a voluntary discharge letter for her to keep for her records / provide to her new physician. Patient verbalized understanding, expressed her thanks, and the call was concluded.

## 2020-06-17 PROBLEM — Z92.82 RECEIVED INTRAVENOUS TISSUE PLASMINOGEN ACTIVATOR (TPA) IN EMERGENCY DEPARTMENT: Status: ACTIVE | Noted: 2020-06-17

## 2020-06-17 PROBLEM — R20.2 PARESTHESIA: Status: ACTIVE | Noted: 2020-06-17

## 2020-06-17 PROBLEM — I63.9 CVA (CEREBRAL VASCULAR ACCIDENT) (HCC): Status: ACTIVE | Noted: 2020-06-17

## 2020-09-17 PROBLEM — M51.26 LUMBAR DISC HERNIATION: Status: ACTIVE | Noted: 2020-09-17

## 2021-12-22 PROBLEM — J34.2 DNS (DEVIATED NASAL SEPTUM): Status: ACTIVE | Noted: 2021-12-22

## (undated) DEVICE — DRAPE TWL SURG 16X26IN BLU ORB04] ALLCARE INC]

## (undated) DEVICE — CUFF BLD PRESSURE MONITORING LNG AD 23-33 CM 1 TUBE MY CUF

## (undated) DEVICE — NEEDLE SPNL 22GA L3.5IN BLK HUB S STL REG WALL FIT STYL W/

## (undated) DEVICE — (D)BNDG ADHESIVE FABRIC 3/4X3 -- DISC BY MFR USE ITEM 357960

## (undated) DEVICE — AVANOS* SHORT BEVEL NEEDLE: Brand: AVANOS

## (undated) DEVICE — MIRAGE SWIFT II PILLOW LGE: Brand: MIRAGE SWIFT II

## (undated) DEVICE — TRAY SUPP STD NO DRUG W EXTENSION SET

## (undated) DEVICE — NEEDLE NRV BLK 21GA L4IN 30DEG INSUL BVL EXTN SET STIMUPLEX

## (undated) DEVICE — SYR 10ML CTRL LR LCK NSAF LF --